# Patient Record
Sex: MALE | Race: WHITE | Employment: FULL TIME | URBAN - METROPOLITAN AREA
[De-identification: names, ages, dates, MRNs, and addresses within clinical notes are randomized per-mention and may not be internally consistent; named-entity substitution may affect disease eponyms.]

---

## 2019-06-20 ENCOUNTER — HOSPITAL ENCOUNTER (OUTPATIENT)
Dept: WOUND CARE | Age: 64
Discharge: HOME | End: 2019-06-20
Payer: COMMERCIAL

## 2019-06-20 VITALS
TEMPERATURE: 98.5 F | HEART RATE: 73 BPM | SYSTOLIC BLOOD PRESSURE: 159 MMHG | RESPIRATION RATE: 16 BRPM | DIASTOLIC BLOOD PRESSURE: 96 MMHG

## 2019-06-20 PROBLEM — L97.923 NON-PRESSURE CHRONIC ULCER OF LEFT LOWER LEG WITH NECROSIS OF MUSCLE (HCC): Status: ACTIVE | Noted: 2019-06-20

## 2019-06-20 PROBLEM — L97.929 CHRONIC VENOUS HYPERTENSION (IDIOPATHIC) WITH ULCER OF LEFT LOWER EXTREMITY (HCC): Status: ACTIVE | Noted: 2019-06-20

## 2019-06-20 PROBLEM — I87.312 CHRONIC VENOUS HYPERTENSION (IDIOPATHIC) WITH ULCER OF LEFT LOWER EXTREMITY (HCC): Status: ACTIVE | Noted: 2019-06-20

## 2019-06-20 PROBLEM — S81.812A LACERATION OF LEFT LOWER LEG WITHOUT FOREIGN BODY: Status: ACTIVE | Noted: 2019-06-20

## 2019-06-20 PROCEDURE — 11043 DBRDMT MUSC&/FSCA 1ST 20/<: CPT

## 2019-06-20 PROCEDURE — 74011000250 HC RX REV CODE- 250: Performed by: OTOLARYNGOLOGY

## 2019-06-20 RX ORDER — AMOXICILLIN AND CLAVULANATE POTASSIUM 500; 125 MG/1; MG/1
1 TABLET, FILM COATED ORAL 2 TIMES DAILY
COMMUNITY
End: 2019-08-15

## 2019-06-20 RX ORDER — METFORMIN HYDROCHLORIDE 500 MG/1
500 TABLET ORAL 2 TIMES DAILY WITH MEALS
COMMUNITY

## 2019-06-20 RX ORDER — FUROSEMIDE 20 MG/1
40 TABLET ORAL 2 TIMES DAILY
COMMUNITY

## 2019-06-20 RX ORDER — POTASSIUM CHLORIDE 750 MG/1
10 CAPSULE, EXTENDED RELEASE ORAL DAILY
Status: ON HOLD | COMMUNITY
End: 2021-03-19 | Stop reason: CLARIF

## 2019-06-20 RX ORDER — MUPIROCIN CALCIUM 20 MG/G
CREAM TOPICAL 2 TIMES DAILY
COMMUNITY
End: 2019-08-15

## 2019-06-20 RX ORDER — LANOLIN ALCOHOL/MO/W.PET/CERES
1 CREAM (GRAM) TOPICAL DAILY
Status: ON HOLD | COMMUNITY
End: 2021-03-19 | Stop reason: CLARIF

## 2019-06-20 RX ADMIN — Medication: at 10:00

## 2019-06-20 NOTE — WOUND CARE
06/20/19 0950   Wound Leg lower Left;Lateral;Distal   Date First Assessed/Time First Assessed: 06/20/19 7921   Location: Leg lower  Wound Location Orientation: Left;Lateral;Distal   Dressing Type Applied Packing;Gauze;Gauze wrap (santos)  (iodoform packing, double tubigrip.)       Discharge Condition:stable  Ambulatory Status: ambulatory  Discharge Destination: home  Transportation:  Personal car  Accompanied by:  wife

## 2019-06-20 NOTE — WOUND CARE
06/20/19 0853   Wound Leg lower Left;Lateral;Distal   Date First Assessed/Time First Assessed: 06/20/19 7345   Location: Leg lower  Wound Location Orientation: Left;Lateral;Distal   Dressing Status Breakthrough drainage;Removed   Dressing Type Adhesive wound dressing (Band-Aid)   Wound Length (cm) 2.5 cm   Wound Width (cm) 2.2 cm   Wound Depth (cm) 1.8 cm   Wound Volume (cm^3) 9.9 cm^3   Condition of Base Pink;Slough   Drainage Amount Moderate   Drainage Color Serosanguinous   Wound Odor None   Colleen-wound Assessment Blanchable erythema   Cleansing and Cleansing Agents  Normal saline     Visit Vitals  BP (!) 159/96   Pulse 73   Temp 98.5 °F (36.9 °C)   Resp 16       Due to network connection error unable to obtain and upload image

## 2019-06-20 NOTE — H&P
[address]  HISTORY AND PHYSICAL    Name:  Vikki Alonso  MR#:  [id number]  :  1955  ACCOUNT #:  [de-identified]  ADMIT DATE:  2019      HISTORY OF PRESENT ILLNESS:  The patient is a 75-year-old male who fell while in [place] on . He developed a hematoma of the left lateral distal leg. He went scuba diving afterwards, but the skin was still intact. He subsequently spontaneously drained and he was seen by Dr. Halina Deng who placed him on mupirocin and Augmentin. PAST MEDICAL HISTORY:  Significant that he had open reduction and internal fixation of the left lateral ankle in  and in , he had a left ankle fusion. In 2017, he developed cellulitis of the left lower extremity. He was admitted to [facility].  His infectious disease doctor was Dr. Greg Roger, who aspirated the wound and cultured Streptococcus and the patient was on IV antibiotics for two weeks. In 2017. The patient saw Dr. Lidia Rincon who obtained x-rays showing no osteomyelitis. The screws were in proper position and the fusion was stable. He recommended that the patient see wound care or a vascular surgeon, but that never occurred. The patient has otherwise been well since then until this recent trauma. He always has a left lower extremity more edematous than the right. He wears diabetics socks but not compression stockings. REVIEW OF SYSTEMS:  Type 2 diabetes and hypertension. MEDICATIONS:  Metformin, currently Augmentin 500/125 mg, and Lasix is usually 20 mg per day and that has been increased to 40 mg b.i.d. by his primary doctor. FAMILY HISTORY:  Noncontributory. PHYSICAL EXAMINATION:  GENERAL:  The patient is awake, alert and conversant. VITAL SIGNS:  His temperature is 98.5, pulse 73, respirations 16, blood pressure 159/96. NEUROLOGIC EXAM:  Cranial nerves II-XII grossly intact.   HEAD AND NECK EXAM:  Oral cavity, oropharynx, palpation of neck is normal.  LUNGS:  Clear to auscultation and percussion. HEART:  Regular rate without murmur. ABDOMEN:  Soft, nontender, no organomegaly. UPPER EXTREMITIES:  Equal tone and strength bilaterally. BACK:  Nontender to palpation. LOWER EXTREMITIES:  The right lower extremity has no edema and good strong dorsalis pedis pulse. The left leg is more edematous than the right with pitting edema. He does have palpable dorsalis pedis pulses. The posterior tibialis is nonpalpable. There is a wound of 2.5 x 2.2 with a depth of 1.8 cm in the central portion of the wound. Bone and hardware is nonpalpable. There is an old clot in the wound and devitalized tissue. It is recommended that the wound be debrided. I explained the risks and benefits. The patient understood and wished to proceed. Therefore, topical Xylocaine 4% gel was applied for 10 minutes. The wound was probed with a Q-tip. It was then debrided of old blood and devitalized tissues down to muscle. The post debridement dimensions remain 2.5 x 2.2 with a depth that is now 1.9 cm. The wound was then painted with Betadine solution. ASSESSMENT AND PLAN:  I explained to the patient that he has left venous insufficiency, almost certainly due to fusion of his left ankle. He does have good strong pulses with his left SHAHRAM of 0.95. Nevertheless, because of his diabetes I am going to get a left SHAHRAM and TBI to make sure we are not dealing with calcification of the vessels. In addition, I am getting a left venous duplex with reflux to rule out a chronic DVT or  reflux. He is going to finish his antibiotics. His wife is going to help him pack the wound with Iodoform gauze daily. He will the keep the area dry. He will keep his leg elevated. We will use double Tubigrips. He will try to do gastrocnemius muscle exercise as well as possible which might require more of an isometric exercise since he has fusion of the left ankle.     He understands that if he is not improving very quickly, we are going to get an MRI or I will refer him back to Dr. Donaldo Thompson for consideration of getting an MRI as per his instructions. The patient will return in one week. All questions were answered. His condition on discharge is stable.       Gee Connor MD      [id number]  D:  06/20/2019 9:42  T:  06/20/2019 9:51  JOB #:  [id number]  CC:  MD Mar yLou Connors Dr.

## 2019-06-27 ENCOUNTER — HOSPITAL ENCOUNTER (OUTPATIENT)
Dept: WOUND CARE | Age: 64
Discharge: HOME | End: 2019-06-27
Payer: COMMERCIAL

## 2019-06-27 VITALS
RESPIRATION RATE: 16 BRPM | HEART RATE: 62 BPM | DIASTOLIC BLOOD PRESSURE: 85 MMHG | SYSTOLIC BLOOD PRESSURE: 124 MMHG | TEMPERATURE: 98.2 F

## 2019-06-27 PROCEDURE — 11042 DBRDMT SUBQ TIS 1ST 20SQCM/<: CPT

## 2019-06-27 PROCEDURE — 74011000250 HC RX REV CODE- 250: Performed by: OTOLARYNGOLOGY

## 2019-06-27 RX ADMIN — Medication: at 10:00

## 2019-06-27 NOTE — WOUND CARE
06/27/19 2889 Wound Leg lower Left;Lateral;Distal  
Date First Assessed/Time First Assessed: 06/20/19 0852   Location: Leg lower  Wound Location Orientation: Left;Lateral;Distal  
Dressing Status Removed;Breakthrough drainage Dressing Type Adhesive wound dressing (Band-Aid);Packing Wound Length (cm) 2 cm Wound Width (cm) 1.5 cm Wound Depth (cm) 1.5 cm Wound Volume (cm^3) 4.5 cm^3 Condition of Base Granulation Drainage Amount Moderate Drainage Color Serosanguinous Wound Odor None Colleen-wound Assessment Intact Cleansing and Cleansing Agents  Normal saline Procedure Tolerated Well Visit Vitals /85 Pulse 62 Temp 98.2 °F (36.8 °C) Resp 16 Due to network connection error unable to obtain and upload image

## 2019-06-27 NOTE — WOUND CARE
06/27/19 1005 Wound Leg lower Left;Lateral;Distal  
Date First Assessed/Time First Assessed: 06/20/19 0852   Location: Leg lower  Wound Location Orientation: Left;Lateral;Distal  
Dressing Type Applied Silver products; Absorptive; Compression Wrap/Venous Stasis Procedure Tolerated Well Discharge Condition:Stable Ambulatory Status:Amulatory Discharge Destination:Home Transportation: Private Accompanied by: Self

## 2019-06-27 NOTE — PROGRESS NOTES
[address] [place] [address] PROGRESS NOTE    Name:  Bea Lea  MR#:  [id number]  :  1955  ACCOUNT #:  [de-identified]  DATE OF SERVICE:  2019    SUBJECTIVE:  The patient returns for treatment of a laceration without foreign body of the left lateral ankle S81.812D, in a patient with type 2 diabetes E11.622 and a venous leg ulcer I87.312, which goes down to muscle L97.923. The patient was seen for a history and physical last week. We started him on Iodoform packing along with double Tubigrips. He did purchase 20-30 mmHg compression stockings, which he has been using intermittently with his Tubigrips. I ordered vascular studies this week. The ABIs and TBIs show excellent blood flow of the left lower extremity with his left TBI at 0.69. In addition, a venous duplex with reflux was obtained showing significant reflux in the common femoral vein, but the perforators seemed not to be an issue. This was reviewed and explained to the patient and his wife. OBJECTIVE:  His temperature today is 98.2, pulse 62, respirations 16, blood pressure 124/85. WOUND EXAMINATION:  The wound of the left lateral ankle last week was 2.5 x 2.2 x 1.8 cm. Today, the dimensions are improved in all three dimensions to 2 x 1.5 x 1.5 cm. The wound is much . There are no longer any residual clot, but there is a layer of slough over the wound and it is recommended that this be debrided. I explained the risks and benefits. The patient understood and wished to proceed. Therefore, topical lidocaine 4% gel was applied for 10 minutes. Using a 5-mm ring curette, the wound was debrided down to nice healthy bleeding subcutaneous tissue. It is no longer down to the level of muscle. The wound dimensions after debridement are 2.1 x 1.6 x 1.6 cm. The wound was then scrubbed with Hibiclens followed by normal saline. ASSESSMENT AND PLAN:  The patient is doing very well on current therapy. Therefore, we are going to try to help him even further by using a three-layer wrap. Therefore, Aquacel Ag was applied to the wound followed by ExuDry and a three-layer wrap. The patient was instructed to keep his leg elevated as much as possible. He will do whatever he can to try to do gastrocnemius muscle exercises, which might require isometric exercises. I explained to him that if his toes become dusky or if the dressing becomes saturated, falls down, or is too tight, he can remove it and apply his 20-30 mmHg stocking. If he needs a nursing visit during the week, he will call on Monday, 07/01 to have that done. I will see him again in a week. All questions were answered. His condition on discharge is stable.       Romayne Dare, MD      [email or web address]  D:  06/27/2019 10:50  T:  06/27/2019 11:05  JOB #:  [id number]

## 2019-07-03 ENCOUNTER — HOSPITAL ENCOUNTER (OUTPATIENT)
Dept: WOUND CARE | Age: 64
Discharge: HOME | End: 2019-07-03
Payer: COMMERCIAL

## 2019-07-03 VITALS
RESPIRATION RATE: 16 BRPM | DIASTOLIC BLOOD PRESSURE: 86 MMHG | HEART RATE: 72 BPM | TEMPERATURE: 98.3 F | SYSTOLIC BLOOD PRESSURE: 130 MMHG

## 2019-07-03 DIAGNOSIS — I87.312 CHRONIC VENOUS HYPERTENSION (IDIOPATHIC) WITH ULCER OF LEFT LOWER EXTREMITY (HCC): ICD-10-CM

## 2019-07-03 DIAGNOSIS — L97.923 NON-PRESSURE CHRONIC ULCER OF LEFT LOWER LEG WITH NECROSIS OF MUSCLE (HCC): ICD-10-CM

## 2019-07-03 DIAGNOSIS — L97.929 CHRONIC VENOUS HYPERTENSION (IDIOPATHIC) WITH ULCER OF LEFT LOWER EXTREMITY (HCC): ICD-10-CM

## 2019-07-03 PROCEDURE — 11042 DBRDMT SUBQ TIS 1ST 20SQCM/<: CPT

## 2019-07-03 NOTE — PROGRESS NOTES
07/03/19 1544   Wound Leg lower Left;Lateral;Distal   Date First Assessed/Time First Assessed: 06/20/19 4752   Location: Leg lower  Wound Location Orientation: Left;Lateral;Distal   Wound Length (cm) 2.5 cm   Wound Width (cm) 1.4 cm   Wound Depth (cm) 0.2 cm   Wound Volume (cm^3) 0.7 cm^3   Condition of Base Pink   Drainage Amount Scant   Drainage Color Serosanguinous   Wound Odor None   Colleen-wound Assessment Blanchable erythema   Cleansing and Cleansing Agents  Chlorhexidine gluconate 4%;Normal saline     Visit Vitals  /86   Pulse 72   Temp 98.3 °F (36.8 °C)   Resp 16

## 2019-07-03 NOTE — PROGRESS NOTES
[address] [place] [address] PROGRESS NOTE    Name:  Amilcar Ace  MR#:  [id number]  :  1955  ACCOUNT #:  [de-identified]  DATE OF SERVICE:  2019      SUBJECTIVE:  The patient returns for treatment of a laceration without foreign body of the left lateral ankle, S81.812D in a type 2 diabetic, E11.622, with a venous hypertension ulcer, I87.312, which originally extended down to muscle, L97.923. The patient originally had a hematoma which was evacuated. Last week, we switched him to a three-layer wrap for the first time. He had a good week and denies any problems or changes in medications, allergies, or review of systems. OBJECTIVE:  VITAL SIGNS:  His temperature today is 98.3, pulse 72, respirations 16, blood pressure 130/86. WOUND EXAMINATION:  The wound dimensions today are 2.5 x 1.4 x 0.2 cm. The wound is filling in quite well. It is a little bit soft and boggy with devitalized tissue covering the wound. It is recommend that the wound be debrided. I explained the risks and benefits. The patient understood and wished to proceed. Therefore, topical Xylocaine 4% gel was applied for 10 minutes. Using a 5-mm ring curette, the wound was debrided of all devitalized tissue. Post debridement dimensions are 2.6 x 1.5 x 0.3 cm. Hemostasis was achieved with gentle pressure until dry. The wound was then scrubbed with Hibiclens followed by normal saline. ASSESSMENT AND PLAN:  The patient is doing very well on current therapy. Therefore, we are going to continue Aquacel Ag, Exu-Dry, and a three-layer wrap. He will keep his leg elevated as much as possible. He will do gastrocnemius muscle exercise hourly while awake. He does have 20-30 mmHg compression stockings for use once he has healed. He will return in 1 week. All questions were answered. His condition on discharge is stable.         MD NELI [email or web address]  D:  2019 17:10  T:  2019 17:16  JOB #:  [id number]

## 2019-07-11 ENCOUNTER — HOSPITAL ENCOUNTER (OUTPATIENT)
Dept: WOUND CARE | Age: 64
Discharge: HOME | End: 2019-07-11
Payer: COMMERCIAL

## 2019-07-11 VITALS
DIASTOLIC BLOOD PRESSURE: 88 MMHG | TEMPERATURE: 98.2 F | RESPIRATION RATE: 16 BRPM | HEART RATE: 59 BPM | SYSTOLIC BLOOD PRESSURE: 133 MMHG

## 2019-07-11 PROCEDURE — 10140 I&D HMTMA SEROMA/FLUID COLLJ: CPT

## 2019-07-11 PROCEDURE — 74011000250 HC RX REV CODE- 250: Performed by: OTOLARYNGOLOGY

## 2019-07-11 RX ADMIN — Medication: at 11:00

## 2019-07-11 NOTE — WOUND CARE
Visit Vitals /88 (BP 1 Location: Right arm, BP Patient Position: Sitting) Pulse (!) 59 Temp 98.2 °F (36.8 °C) Resp 16  
 
 
 07/11/19 1011 Wound Leg lower Left;Lateral;Distal  
Date First Assessed/Time First Assessed: 06/20/19 0852   Location: Leg lower  Wound Location Orientation: Left;Lateral;Distal  
Dressing Status Clean, dry, and intact Dressing Type Silver products; Compression Wrap/Venous Stasis Wound Length (cm) 2.5 cm Wound Width (cm) 1.4 cm Wound Depth (cm) 0.2 cm Wound Volume (cm^3) 0.7 cm^3 Condition of Base Pink Drainage Amount Scant Drainage Color Serosanguinous Wound Odor None Colleen-wound Assessment Intact Cleansing and Cleansing Agents  Soap and water

## 2019-07-11 NOTE — WOUND CARE
07/11/19 1030 Wound Leg lower Left;Lateral;Distal  
Date First Assessed/Time First Assessed: 06/20/19 0852   Location: Leg lower  Wound Location Orientation: Left;Lateral;Distal  
Dressing Type Applied Collagens/cell matrix; Absorptive; Compression Wrap/Venous Stasis (hfbr) Discharge Condition:Stable Ambulatory Status:Walking Discharge Destination:Home Transportation: Private Accompanied by: Wife and family

## 2019-07-11 NOTE — PROGRESS NOTES
[address] [place] [address] PROGRESS NOTE    Name:  Jettie Meigs  MR#:  [id number]  :  1955  ACCOUNT #:  [de-identified]  DATE OF SERVICE:  2019      SUBJECTIVE:  The patient returns for a laceration without foreign body of the left lateral ankle S81.812D in a type 2 diabetic E11.622 with a venous leg ulcer I87.312, which goes down to muscle, L97.913. Unfortunately, during the week the patient dropped a bag of ice on his left lateral ankle, just distal to the area of the wound. OBJECTIVE:  VITAL SIGNS:  His temperature today is 98.2, pulse 59, respirations 16, blood pressure 133/88. WOUND EXAMINATION:  Examination of the left lateral ankle shows two wounds. The primary wound is the wound that he presented with initially, which is dramatically smaller at 1.1 x 1.1 x 0.2 cm. Then there is healthy skin and distally there is a small hematoma. Collectively, these two wounds now measure 2.5 x 1.4 x 0.2 cm. I recommended that the distal hematoma be evacuated. I explained the risks and benefits. The patient understood and wished to proceed. Therefore, topical Xylocaine was applied for 5 minutes. Using a 5-mm ring curette, the hematoma was evacuated from the wound and in the area of undermining proximally towards the primary wound. Hemostasis was achieved with firm pressure until dry. ASSESSMENT AND PLAN:  The patient's primary wound is doing quite well. Unfortunately, this new traumatic wound has set things back. We are going to change the dressing to both wounds including the area of undermining with Rita Ag and cover this with Hydrofera Blue followed by a three-layer wrap. The patient will keep his legs elevated. He will do gastrocnemius muscle exercise hourly while awake. He will return to see us in one week. All questions were answered. His condition on discharge is stable.       Monique Gonzalez MD      [email or web address]  D:  2019 10:45  T: 07/11/2019 10:53  JOB #:  [id number]

## 2019-07-18 ENCOUNTER — HOSPITAL ENCOUNTER (OUTPATIENT)
Dept: WOUND CARE | Age: 64
Discharge: HOME | End: 2019-07-18
Payer: COMMERCIAL

## 2019-07-18 VITALS
SYSTOLIC BLOOD PRESSURE: 132 MMHG | HEART RATE: 66 BPM | TEMPERATURE: 98.7 F | RESPIRATION RATE: 16 BRPM | DIASTOLIC BLOOD PRESSURE: 88 MMHG

## 2019-07-18 PROCEDURE — 97597 DBRDMT OPN WND 1ST 20 CM/<: CPT

## 2019-07-18 PROCEDURE — 74011000250 HC RX REV CODE- 250: Performed by: OTOLARYNGOLOGY

## 2019-07-18 RX ADMIN — Medication: at 10:00

## 2019-07-18 NOTE — PROGRESS NOTES
[address] [place] [address] PROGRESS NOTE    Name:  Bea Lea  MR#:  [id number]  :  1955  ACCOUNT #:  [de-identified]  DATE OF SERVICE:  2019      SUBJECTIVE:  The patient returns for treatment of a wound of his left lateral leg which is a laceration without foreign body, S81.812D in a type 2 diabetic, E11.622, with a venous hypertension ulcer, I87.312 which originally went down to muscle, L97.913. Last week, the patient had a second wound due to another accidental trauma. We switched the dressing to Irta, Hydrofera blue, and a three-layer wrap. He has been keeping his leg elevated and doing gastrocnemius muscle exercises. The only change in medications over the past week is his furosemide dose was decreased to 20 mg b.i.d.    OBJECTIVE:  VITAL SIGNS:  The patient's temperature is 98.7, pulse 66, respirations 16, blood pressure 132/80. WOUND EXAMINATION:  The wound dimensions today are 1.9 x 0.5 x 0.2 cm. The wound is filled with hypertrophic tissue that is filling the wound. It is recommended the wound be debrided and cauterized to create involution of the hypertrophic tissue. I explained the risks and benefits. The patient understood and wished to proceed. Therefore, topical Xylocaine 4% gel was applied for 10 minutes. Using a 5-mm ring curette, the wound was debrided of all devitalized tissue. Hemostasis was achieved with gentle pressure until dry. The hypertrophic tissue was then cauterized in its entirety and then irrigated clear with saline. ASSESSMENT AND PLAN:  Due to the hypergranular tissue, we are going to change the dressing to Mepilex Ag foam followed by a three-layer wrap. The patient will start using his compression stocking on the right leg. He is going to keep his legs elevated. He will do gastrocnemius muscle exercise hourly while awake. Next week, we are going to order a lymphedema pump. All questions were answered.   His condition on discharge is stable. He will return in 1 week.         Deanne Early MD      [email or web address]  D:  07/18/2019 11:12  T:  07/18/2019 11:20  JOB #:  [id number]

## 2019-07-18 NOTE — WOUND CARE
07/18/19 1057   Wound Leg lower Left;Lateral;Distal   Date First Assessed/Time First Assessed: 06/20/19 5122   Location: Leg lower  Wound Location Orientation: Left;Lateral;Distal   Dressing Type Applied Silver products; Compression Wrap/Venous Stasis   Procedure Tolerated Well       Discharge Condition: stable  Ambulatory Status: ambulatory without assistance  Discharge Destination: home  Transportation:  Personal car  Accompanied by:  wife

## 2019-07-18 NOTE — WOUND CARE
07/18/19 0957   Wound Leg lower Left;Lateral;Distal   Date First Assessed/Time First Assessed: 06/20/19 7453   Location: Leg lower  Wound Location Orientation: Left;Lateral;Distal   Dressing Status Clean, dry, and intact; Removed   Dressing Type Compression Wrap/Venous Stasis; Absorptive  (HFBR)   Wound Length (cm) 1.9 cm   Wound Width (cm) 0.5 cm   Wound Depth (cm) 0.6 cm   Wound Volume (cm^3) 0.57 cm^3   Condition of Base [place];Granulation   Drainage Amount Small   Drainage Color Serosanguinous   Wound Odor None   Colleen-wound Assessment Intact   Cleansing and Cleansing Agents  Soap and water       Visit Vitals  /88   Pulse 66   Temp 98.7 °F (37.1 °C)   Resp 16     Due to network connection error unable to obtain and upload image

## 2019-07-25 ENCOUNTER — HOSPITAL ENCOUNTER (OUTPATIENT)
Dept: WOUND CARE | Age: 64
Discharge: HOME | End: 2019-07-25
Payer: COMMERCIAL

## 2019-07-25 VITALS
RESPIRATION RATE: 16 BRPM | DIASTOLIC BLOOD PRESSURE: 80 MMHG | SYSTOLIC BLOOD PRESSURE: 129 MMHG | HEART RATE: 56 BPM | TEMPERATURE: 97.7 F

## 2019-07-25 PROCEDURE — 74011000250 HC RX REV CODE- 250: Performed by: OTOLARYNGOLOGY

## 2019-07-25 PROCEDURE — 17250 CHEM CAUT OF GRANLTJ TISSUE: CPT

## 2019-07-25 RX ADMIN — Medication: at 11:00

## 2019-07-25 NOTE — WOUND CARE
07/25/19 1115   Wound Leg lower Left;Lateral;Distal   Date First Assessed/Time First Assessed: 06/20/19 9231   Location: Leg lower  Wound Location Orientation: Left;Lateral;Distal   Dressing Changed Changed/New   Dressing Type Applied Silver products; Compression Wrap/Venous Stasis   Procedure Tolerated Well   Pt DC to home via self, ambulatory, stable

## 2019-07-25 NOTE — WOUND CARE
07/25/19 1035   Wound Leg lower Left;Lateral;Distal   Date First Assessed/Time First Assessed: 06/20/19 1053   Location: Leg lower  Wound Location Orientation: Left;Lateral;Distal   Dressing Status Clean, dry, and intact; Removed   Dressing Type Compression Wrap/Venous Stasis; Foam   Wound Length (cm) 1.3 cm   Wound Width (cm) 0.4 cm   Wound Depth (cm) 1.3 cm   Wound Volume (cm^3) 0.68 cm^3   Condition of Base Granulation   Drainage Amount Small   Drainage Color Serosanguinous   Wound Odor None   Colleen-wound Assessment Intact   Cleansing and Cleansing Agents  Soap and water     Visit Vitals  /80   Pulse (!) 56   Temp 97.7 °F (36.5 °C)   Resp 16     Due to network connection error unable to obtain and upload image

## 2019-07-25 NOTE — PROGRESS NOTES
[address] [place] [address] PROGRESS NOTE    Name:  Lev Mcqueen  MR#:  [id number]  :  1955  ACCOUNT #:  [de-identified]  DATE OF SERVICE:  2019      SUBJECTIVE:  The patient returns for treatment of a laceration without foreign body of the left leg, S81.812D, in a type 2 diabetic, E11.622, with a venous hypertension ulcer, I87.312, which originally went down to muscle, L97.913, and secondary lymphedema I89.0. Last week, the wound was hypergranular. It was debrided and cauterized with silver nitrate. We changed the dressing to Mepilex silver foam and a three-layer wrap. The patient has been using compression on the right side and as had no problems using it. There have been no changes in medications, allergies or review of systems. OBJECTIVE:  His temperature today is 97.7, pulse 56, respirations 16, blood pressure 129/80. The wound dimensions today are improved to 1.3 x 0.4 x 0.1 cm. The wound is covered with soft slough which was easily removed just with a moistened gauze. There was hypertrophic spongy granulation tissue filling the wound and mature skin edges. It was recommended that the wound be cauterized with silver nitrate to create involution of the hypergranular tissue and to treat the surrounding epibole. I explained the risk and benefits. The patient understood and wished to proceed. Therefore, topical Xylocaine 4% gel was applied for 5 minutes. This was wiped clean and the wound was then cauterized in its entirety with silver nitrate. This was irrigated clear. ASSESSMENT AND PLAN:  We are going to continue silver foam and a three-layer wrap along with leg elevation, gastrocnemius muscle exercise hourly while awake, and the lymphedema pump twice a day. We are going to order a lymphedema pump today for long-term management of this patient's lymphedema and recurrent wounds due to venous hypertension. All questions were answered.   His condition on discharge is stable.         MD NELI [email or web address]  D:  07/25/2019 11:05  T:  07/25/2019 11:13  JOB #:  [id number]

## 2019-08-01 ENCOUNTER — HOSPITAL ENCOUNTER (OUTPATIENT)
Dept: WOUND CARE | Age: 64
Discharge: HOME | End: 2019-08-01
Payer: COMMERCIAL

## 2019-08-01 VITALS
TEMPERATURE: 97.9 F | SYSTOLIC BLOOD PRESSURE: 120 MMHG | HEART RATE: 65 BPM | DIASTOLIC BLOOD PRESSURE: 79 MMHG | RESPIRATION RATE: 16 BRPM

## 2019-08-01 DIAGNOSIS — I87.312 CHRONIC VENOUS HYPERTENSION (IDIOPATHIC) WITH ULCER OF LEFT LOWER EXTREMITY (HCC): ICD-10-CM

## 2019-08-01 DIAGNOSIS — L97.929 CHRONIC VENOUS HYPERTENSION (IDIOPATHIC) WITH ULCER OF LEFT LOWER EXTREMITY (HCC): ICD-10-CM

## 2019-08-01 DIAGNOSIS — L97.923 NON-PRESSURE CHRONIC ULCER OF LEFT LOWER LEG WITH NECROSIS OF MUSCLE (HCC): ICD-10-CM

## 2019-08-01 PROCEDURE — 11043 DBRDMT MUSC&/FSCA 1ST 20/<: CPT

## 2019-08-01 PROCEDURE — 74011000250 HC RX REV CODE- 250: Performed by: OTOLARYNGOLOGY

## 2019-08-01 RX ADMIN — Medication: at 11:00

## 2019-08-01 NOTE — PROGRESS NOTES
[address] [place] [address] PROGRESS NOTE    Name:  Win Bautista  MR#:  [id number]  :  1955  ACCOUNT #:  [de-identified]  DATE OF SERVICE:  2019    SUBJECTIVE:  The patient returns for treatment of a laceration without foreign body of the left leg, S81.812D, with a venous hypertension ulcer I87.312, which extends down to muscle, L97.913 in a type 2 diabetic, E11.622, with secondary lymphedema I89.0. The patient traveled a lot this week. He flew and had difficulty at [facility], where the agents kept massaging his leg with his wrap, which he felt was counterproductive for his wound. His lymphedema pump is still on hold, waiting for insurance approval.  There have been no other changes noted in his medications, allergies, or review of systems. OBJECTIVE:  VITAL SIGNS:  His temperature is 97.9, pulse 65, respirations 16, and blood pressure 120/79. WOUND EXAMINATION:  The wound dimensions last week were 1.3 x 0.4 x 1.3 cm. Today, they are slightly longer and wider at 1.9 x 0.9 x 0.7 cm, without any signs of cellulitis such as erythema, tenderness, or pus. The wound is filled with soft spongy tissue which is poorly developed granulation tissue with minimal tensile strength. It is recommended that the wound be debrided of all devitalized tissue. I explained the risks and benefits. The patient understood and wished to proceed. Therefore, topical Xylocaine 4% gel was applied for 10 minutes. Using a compressed 5-mm ring curette, all of the soft spongy hypergranular tissue was excised down to healthy muscle. All edges of the wound and the residual bed of the wound were then cauterized with silver nitrate to help manage the hypertrophic tissue. This was then irrigated clear with saline. ASSESSMENT AND PLAN:  The patient continues to have significant edema and hopefully, he will improve dramatically once he is able to use the lymphedema pump once it is available.   He will increase his furosemide to twice a day for the next 2 weeks to see if this will help mange his lower extremity edema while he is traveling. He is due to drive 6 hours on Monday, but will try to take frequent breaks to elevate his leg. He knows that he should continue to do gastrocnemius muscle exercises. We are going to continue with silver foam as the dressing, but we are going to go for a four-layer wrap to see if this will help him, especially since he has such a good SHAHRAM on that side. He will continue leg elevation and exercises and will use his pump twice a day as soon as it arrives. All questions were answered. His condition on discharge is stable.       Cash Tejada MD      [email or web address]  D:  08/01/2019 10:45  T:  08/01/2019 10:53  JOB #:  [id number]

## 2019-08-01 NOTE — WOUND CARE
08/01/19 1055 Wound Leg lower Left;Lateral;Distal  
Date First Assessed/Time First Assessed: 06/20/19 0852   Location: Leg lower  Wound Location Orientation: Left;Lateral;Distal  
Dressing Type Applied ABD pad;Foam;Compression Wrap/Venous Stasis (mep ag, 4 layer wrap) Procedure Tolerated Well Discharge Condition:stable Ambulatory Status:ambulatory Discharge Destination:home Transportation: private auto Accompanied by: spouse

## 2019-08-01 NOTE — WOUND CARE
08/01/19 1020 Wound Leg lower Left;Lateral;Distal  
Date First Assessed/Time First Assessed: 06/20/19 0852   Location: Leg lower  Wound Location Orientation: Left;Lateral;Distal  
Dressing Status Clean, dry, and intact Dressing Type Compression Wrap/Venous Stasis Wound Length (cm) 1.9 cm Wound Width (cm) 0.9 cm Wound Depth (cm) 0.7 cm Wound Volume (cm^3) 1.2 cm^3 Drainage Amount Small Drainage Color Serosanguinous Wound Odor None Colleen-wound Assessment Intact Cleansing and Cleansing Agents  Soap and water Visit Vitals /79 Pulse 65 Temp 97.9 °F (36.6 °C) Resp 16

## 2019-08-01 NOTE — DISCHARGE INSTRUCTIONS
Discharge Instructions for  [address] [facility], [facility] [address]  Telephone: [phone]    NAME:  Kellen Fang  YOB: 1955  MEDICAL RECORD NUMBER:  [id number]  DATE:  8/1/2019    Wound Cleansing:   Do not scrub or use excessive force. Cleanse wound prior to applying a clean dressing with:  [] Normal Saline [] Keep Wound Dry in Shower    [] Wound Cleanser   [x] Cleanse wound with Mild Soap & Water  [] May Shower at Discharge   [] Other:       Topical Treatments:  Do not apply lotions, creams, or ointments to wound bed unless directed. [x] Apply moisturizing lotion to skin surrounding the wound prior to dressing change.  [] Apply antifungal ointment to skin surrounding the wound prior to dressing change.  [] Apply thin film of moisture barrier ointment to skin immediately around wound. [] Other:       Dressings:           Wound Location Left lower leg  [x] Apply Primary Dressing:       [] MediHoney Gel [] Alginate with Silver [] Alginate   [] Collagen [] Collagen with Silver   [] Santyl with Moisten saline gauze     [] Hydrocolloid   [] MediHoney Alginate [] Foam with Silver   [] Foam   [] Hydrofera Blue    [] Mepilex Border    [] Moisten with Saline [] Hydrogel [] Mepitel     [] Bactroban/Mupirocin [] Polysporin  [x] Other:   Mepilex ag, cut mep ag to wound size , then cover with larger piece. [] Pack wound loosely with  [] Iodoform   [] Plain Packing  [] Other   [x] Cover and Secure with:     [] Gauze [] Kristian [] Kerlix   [] Ace Wrap [] Cover Roll Tape [x] ABD     [] Other:    Avoid contact of tape with skin.   [x] Change dressing: [] Daily    [] Every Other Day [] Three times per week   [x] Once a week [] Do Not Change Dressing   [] Other:       Edema Control:  Apply: [] Compression Stocking []Right Leg []Left Leg   [] Tubigrip []Right Leg Double Layer []Left Leg Double Layer       []Right Leg Single Layer []Left Leg Single Layer   [] SpandaGrip []Right Leg  []Left Leg      []Low compression 5-10 mm/Hg      []Medium compression 10-20 mm/Hg     []High compression  20-30 mm/Hg   every morning immediately when getting up should be applied to affected leg(s) from mid foot to knee making sure to cover the heel. Remove every night before going to bed. [x] Elevate leg(s) above the level of the heart when sitting. [x] Avoid prolonged standing in one place. [] Elevate arm/hand above the level of the heart []RightArm []LeftArm     Compression:  Apply: [x] Multilayer Compression Wrap Applied in Clinic []RightLeg [x]Left Leg 4 layer wrap   [x] Multi-layer compression. Do not get leg(s) with wrap wet. If wraps become too tight call the center or completely remove the wrap. [x] Elevate leg(s) above the level of the heart when sitting. [x] Avoid prolonged standing in one place     Dietary:  [] Diet as tolerated: [] Calorie Diabetic Diet: [] No Added Salt:  [x] Increase Protein: [] Other:   Activity:  [x] Activity as tolerated:  [] Patient has no activity restrictions     [] Strict Bedrest: [] Remain off Work:     [] May return to full duty work:                                   [] Return to work with restrictions:             Return Appointment:  [] Wound and dressing supply provider:   [] ECF or Home Healthcare:  [] Wound Assessment: [] Physician or NP scheduled for Wound Assessment:   [x] Return Appointment: With Dr. Jonathan Box  in  [address] [place] [address])  [] Ordered tests:      Electronically signed on 8/1/2019 at 10:33 AM     Leticia Martinez 281: Should you experience any significant changes in your wound(s) or have questions about your wound care, please contact the [facility] Main at [address] [place] [address] 8:00 am - 4:30. If you need help with your wound outside these hours and cannot wait until we are again available, contact your PCP or go to the hospital emergency room.      PLEASE NOTE: IF YOU ARE UNABLE TO OBTAIN WOUND SUPPLIES, CONTINUE TO USE THE SUPPLIES YOU HAVE AVAILABLE UNTIL YOU ARE ABLE TO REACH US. IT IS MOST IMPORTANT TO KEEP THE WOUND COVERED AT ALL TIMES.      Physician Signature:_______________________    Date: ___________ Time:  ____________

## 2019-08-08 ENCOUNTER — HOSPITAL ENCOUNTER (OUTPATIENT)
Dept: WOUND CARE | Age: 64
Discharge: HOME | End: 2019-08-08
Payer: COMMERCIAL

## 2019-08-08 VITALS
TEMPERATURE: 97.3 F | RESPIRATION RATE: 18 BRPM | DIASTOLIC BLOOD PRESSURE: 87 MMHG | HEART RATE: 74 BPM | SYSTOLIC BLOOD PRESSURE: 136 MMHG

## 2019-08-08 PROCEDURE — 97597 DBRDMT OPN WND 1ST 20 CM/<: CPT

## 2019-08-08 PROCEDURE — 74011000250 HC RX REV CODE- 250: Performed by: OTOLARYNGOLOGY

## 2019-08-08 RX ADMIN — Medication: at 11:00

## 2019-08-08 NOTE — PROGRESS NOTES
[address] [place] [address] PROGRESS NOTE    Name:  Mallory Garcia  MR#:  [id number]  :  1955  ACCOUNT #:  [de-identified]  DATE OF SERVICE:  2019      SUBJECTIVE:  The patient returns for treatment of a laceration without foreign body of the left leg, S81.812D with a venous hypertension ulcer, I87.312, which originally went down to muscle, L97.913, in a type 2 diabetic, E11.62, with secondary lymphedema, I89.0. The patient had a good week. He denies having any problems or changes in medications, allergies, or review of systems. OBJECTIVE:  VITAL SIGNS:  His temperature is 97.3, pulse 74, respirations 18, blood pressure 136/87. WOUND EXAMINATION:  Examination of the left leg shows the edema is much better managed this week since switching him to a 4-layer wrap. The wound dimensions are smaller in all 3 dimensions at 1.4 x 0.6 x 0.5 cm. The tissue is now being bisected by healthy skin in the middle of the wound, so the wound is essentially 2 wounds. There is slough covering the wounds and the wounds remain as soft, spongy granulation tissue. It is recommended that the wounds be debrided and cauterized. I explained the risks and benefits. The patient understood and wished to proceed. Therefore, topical Xylocaine 4% gel was applied for 10 minutes. Using a 5-mm ring curette, the wounds were debrided down to nice healthy bleeding subcutaneous tissue. Hemostasis was achieved with firm pressure until dry. The wounds were then cauterized with silver nitrate until dry. Post debridement dimensions were unchanged since all the changes were within the substance of the stated dimensions which were essentially 2 wounds. ASSESSMENT AND PLAN:  We are going to continue silver foam along with a 4-layer wrap. The patient will keep his legs elevated as much as possible. He will continue to do gastrocnemius muscle exercise hourly while awake.   He has still not yet received his lymphedema pump, but once it arrives, hopefully he can start using that device. All questions were answered. His condition on discharge is stable. He will return in 1 week.         MD NELI [email or web address]  D:  08/08/2019 11:11  T:  08/08/2019 11:19  JOB #:  [id number]

## 2019-08-08 NOTE — WOUND CARE
08/08/19 1103 Wound Leg lower Left;Lateral;Distal  
Date First Assessed/Time First Assessed: 06/20/19 0852   Location: Leg lower  Wound Location Orientation: Left;Lateral;Distal  
Dressing Type Applied Foam;Compression Wrap/Venous Stasis Discharge Condition:stable Ambulatory Status:walking Discharge Destination:home Transportation: private Accompanied by: self

## 2019-08-08 NOTE — WOUND CARE
08/08/19 1001 Wound Leg lower Left;Lateral;Distal  
Date First Assessed/Time First Assessed: 06/20/19 0852   Location: Leg lower  Wound Location Orientation: Left;Lateral;Distal  
Dressing Status Old drainage Dressing Type Compression Wrap/Venous Stasis;Silver products; Foam  
Wound Length (cm) 1.4 cm Wound Width (cm) 0.6 cm Wound Depth (cm) 0.5 cm Wound Volume (cm^3) 0.42 cm^3 Condition of Base Pink;Slough Condition of Edges Rolled/curled Drainage Amount Moderate Drainage Color Serosanguinous Wound Odor None Cleansing and Cleansing Agents  Soap and water Visit Vitals /87 Pulse 74 Temp 97.3 °F (36.3 °C) Resp 18

## 2019-08-15 ENCOUNTER — HOSPITAL ENCOUNTER (OUTPATIENT)
Dept: WOUND CARE | Age: 64
Discharge: HOME | End: 2019-08-15
Payer: COMMERCIAL

## 2019-08-15 VITALS
TEMPERATURE: 98.6 F | HEART RATE: 57 BPM | RESPIRATION RATE: 16 BRPM | DIASTOLIC BLOOD PRESSURE: 84 MMHG | SYSTOLIC BLOOD PRESSURE: 138 MMHG

## 2019-08-15 PROCEDURE — 74011000250 HC RX REV CODE- 250: Performed by: OTOLARYNGOLOGY

## 2019-08-15 PROCEDURE — 97597 DBRDMT OPN WND 1ST 20 CM/<: CPT

## 2019-08-15 RX ADMIN — Medication: at 11:00

## 2019-08-15 NOTE — DISCHARGE INSTRUCTIONS
Discharge Instructions for  [facility]. [address] [facility], [address] [place] [address] Avenue  Telephone: [phone]    NAME:  Bebeto Prior OF BIRTH:  1955  MEDICAL RECORD NUMBER:  [id number]  DATE:  08/15/19    Wound Cleansing:   Do not scrub or use excessive force. Cleanse wound prior to applying a clean dressing with:  [] Normal Saline [x] Keep Wound Dry in Shower    [] Wound Cleanser   [x] Cleanse wound with Mild Soap & Water  [] May Shower at Discharge   [] Other:       Topical Treatments:  Do not apply lotions, creams, or ointments to wound bed unless directed. [x] Apply moisturizing lotion to skin surrounding the wound prior to dressing change.  [] Apply antifungal ointment to skin surrounding the wound prior to dressing change.  [] Apply thin film of moisture barrier ointment to skin immediately around wound. [] Other:       Dressings:           Wound Location left leg  [x] Apply Primary Dressing:       [] MediHoney Gel [] Alginate with Silver [] Alginate   [] Collagen [] Collagen with Silver   [] Santyl with Moisten saline gauze     [] Hydrocolloid   [] MediHoney Alginate [] Foam with Silver   [] Foam   [] Hydrofera Blue    [] Mepilex Border    [] Moisten with Saline [] Hydrogel [] Mepitel     [] Bactroban/Mupirocin [] Polysporin  [x] Other:  Mep ag foam cut to size of wound then cover with more mep ag foam  [] Pack wound loosely with  [] Iodoform   [] Plain Packing  [] Other   [x] Cover and Secure with:     [x] Gauze [] Kristian [] Kerlix   [] Ace Wrap [] Cover Roll Tape [] ABD     [] Other:    Avoid contact of tape with skin. [x] Change dressing: [] Daily    [] Every Other Day [] Three times per week   [x] Once a week [] Do Not Change Dressing   [] Other:    Compression:  Apply: [x] Multilayer Compression Wrap Applied in Clinic 4 layer []RightLeg [x]Left Leg   [x] Multi-layer compression. Do not get leg(s) with wrap wet.   If wraps become too tight call the center or completely remove the wrap. [x] Elevate leg(s) above the level of the heart when sitting. [x] Avoid prolonged standing in one place.     Dietary:  [] Diet as tolerated: [] Calorie Diabetic Diet: [] No Added Salt:  [x] Increase Protein: [] Other:   Activity:  [x] Activity as tolerated:  [] Patient has no activity restrictions     [] Strict Bedrest: [] Remain off Work:     [] May return to full duty work:                                   [] Return to work with restrictions:             Return Appointment:  [] Wound and dressing supply provider:   [] ECF or Home Healthcare:  [] Wound Assessment: [] Physician or NP scheduled for Wound Assessment:   [x] Return Appointment: With Dr. Sheba Guzmán  in  [address] [place] [address])  [] Ordered tests:

## 2019-08-15 NOTE — PROGRESS NOTES
[address] [place] [address] PROGRESS NOTE    Name:  Nelly Girard  MR#:  [id number]  :  1955  ACCOUNT #:  [de-identified]  DATE OF SERVICE:  08/15/2019      SUBJECTIVE:  The patient returns for treatment of a laceration without foreign body of the left leg, SA1.812D which originally extended down to muscle I87.312, L97.923 in a patient with type 2 diabetes E11.622, and secondary lymphedema I89.0. The patient had a very good week. He was up more than he wished and was not able to keep his legs elevated as much as desirable. His lymphedema pump has not yet arrived but hopefully it will arrive today. OBJECTIVE:  VITAL SIGNS:  His temperature is 98.6, pulse 57, respirations 16, and blood pressure 138/84. WOUND EXAMINATION:  The wound dimensions last week were 1.4 x 0.6 x 0.5 cm. Today, they are dramatically improved at 0.6 x 0.6 x 0.2 cm. The wound is still soft and spongy tissue and covered with slough and remains hypertrophic, coming up out of the wound. Therefore, it is recommended that the wound be debrided and cauterized. I explained the risks and benefits. The patient understood and wished to proceed. Therefore, topical Xylocaine 4% gel was applied for 10 minutes. Using a 7-mm ring curette, the wound was debrided of all devitalized tissue. Hemostasis was achieved with gentle pressure until dry. The entire surface of the wound was debrided to create involution of the hypertrophic tissue. ASSESSMENT AND PLAN:  The patient is doing very well on current therapy. Therefore, we are going to continue the exact same regimen of a double layer Mepilex Ag foam followed by a four-layer wrap along with leg elevation, calf muscle exercises hourly while awake, and the use of lymphedema pump once it arrives at his home. All questions were answered. His condition on discharge is stable. He will return in 1 week.       MD NELI [email or web address]  D:  08/15/2019 10:34  T:  08/15/2019 10:42  JOB #:  [id number]

## 2019-08-15 NOTE — DISCHARGE INSTRUCTIONS
Discharge Instructions for  [facility]. [address] [facility], [address] [place] [address] Avenue  Telephone: [phone]    NAME:  Marciano Pineda OF BIRTH:  1955  MEDICAL RECORD NUMBER:  [id number]  DATE:  08/15/19    Wound Cleansing:   Do not scrub or use excessive force. Cleanse wound prior to applying a clean dressing with:  [] Normal Saline [x] Keep Wound Dry in Shower    [] Wound Cleanser   [x] Cleanse wound with Mild Soap & Water  [] May Shower at Discharge   [] Other:       Topical Treatments:  Do not apply lotions, creams, or ointments to wound bed unless directed. [x] Apply moisturizing lotion to skin surrounding the wound prior to dressing change.  [] Apply antifungal ointment to skin surrounding the wound prior to dressing change.  [] Apply thin film of moisture barrier ointment to skin immediately around wound. [] Other:       Dressings:           Wound Location left leg  [x] Apply Primary Dressing:       [] MediHoney Gel [] Alginate with Silver [] Alginate   [] Collagen [] Collagen with Silver   [] Santyl with Moisten saline gauze     [] Hydrocolloid   [] MediHoney Alginate [] Foam with Silver   [] Foam   [] Hydrofera Blue    [] Mepilex Border    [] Moisten with Saline [] Hydrogel [] Mepitel     [] Bactroban/Mupirocin [] Polysporin  [x] Other:  Mep ag foam cut to size of wound then cover with more mep ag foam  [] Pack wound loosely with  [] Iodoform   [] Plain Packing  [] Other   [x] Cover and Secure with:     [x] Gauze [] Kristian [] Kerlix   [] Ace Wrap [] Cover Roll Tape [] ABD     [] Other:    Avoid contact of tape with skin. [x] Change dressing: [] Daily    [] Every Other Day [] Three times per week   [x] Once a week [] Do Not Change Dressing   [] Other:    Compression:  Apply: [x] Multilayer Compression Wrap Applied in Clinic 4 layer []RightLeg [x]Left Leg   [x] Multi-layer compression. Do not get leg(s) with wrap wet.   If wraps become too tight call the center or completely remove the wrap. [x] Elevate leg(s) above the level of the heart when sitting. [x] Avoid prolonged standing in one place. Dietary:  [] Diet as tolerated: [] Calorie Diabetic Diet: [] No Added Salt:  [x] Increase Protein: [] Other:   Activity:  [x] Activity as tolerated:  [] Patient has no activity restrictions     [] Strict Bedrest: [] Remain off Work:     [] May return to full duty work:                                   [] Return to work with restrictions:             Return Appointment:  [] Wound and dressing supply provider:   [] ECF or Home Healthcare:  [] Wound Assessment: [] Physician or NP scheduled for Wound Assessment:   [x] Return Appointment: With Dr. Pasha Roland  in  [address] [place] [address])  [] Ordered tests:         [facility] Information: Should you experience any significant changes in your wound(s) or have questions about your wound care, please contact the [facility] Main at [address] [place] [address] 8:00 am - 4:30. If you need help with your wound outside these hours and cannot wait until we are again available, contact your PCP or go to the hospital emergency room. PLEASE NOTE: IF YOU ARE UNABLE TO OBTAIN WOUND SUPPLIES, CONTINUE TO USE THE SUPPLIES YOU HAVE AVAILABLE UNTIL YOU ARE ABLE TO REACH US. IT IS MOST IMPORTANT TO KEEP THE WOUND COVERED AT ALL TIMES.      Physician Signature:_______________________    Date: ___________ Time:  ____________

## 2019-08-15 NOTE — WOUND CARE
Visit Vitals /84 (BP 1 Location: Right arm, BP Patient Position: Sitting) Pulse (!) 57 Temp 98.6 °F (37 °C) Resp 16  
 
 
 08/15/19 0959 Wound Leg lower Left;Lateral;Distal  
Date First Assessed/Time First Assessed: 06/20/19 0852   Location: Leg lower  Wound Location Orientation: Left;Lateral;Distal  
Dressing Status Breakthrough drainage Dressing Type Foam;Compression Wrap/Venous Stasis Wound Length (cm) 0.6 cm Wound Width (cm) 0.6 cm Wound Depth (cm) 0.2 cm Wound Volume (cm^3) 0.07 cm^3 Condition of Base Annabella;Slough Drainage Amount Small Drainage Color Serosanguinous Wound Odor None Cleansing and Cleansing Agents  Soap and water

## 2019-08-15 NOTE — WOUND CARE
08/15/19 1045   Wound Leg lower Left;Lateral;Distal   Date First Assessed/Time First Assessed: 06/20/19 0852   Location: Leg lower  Wound Location Orientation: Left;Lateral;Distal   Dressing Type Applied 4 x 4;Compression Wrap/Venous Stasis; Foam   Procedure Tolerated Well   Discharge Condition:stable  Ambulatory Status:ambulatory  Discharge Destination:home  Transportation: private auto  Accompanied by: spouse

## 2019-08-22 ENCOUNTER — HOSPITAL ENCOUNTER (OUTPATIENT)
Dept: WOUND CARE | Age: 64
Discharge: HOME | End: 2019-08-22
Payer: COMMERCIAL

## 2019-08-22 VITALS
DIASTOLIC BLOOD PRESSURE: 85 MMHG | SYSTOLIC BLOOD PRESSURE: 135 MMHG | TEMPERATURE: 98.4 F | RESPIRATION RATE: 16 BRPM | HEART RATE: 69 BPM

## 2019-08-22 PROCEDURE — 17250 CHEM CAUT OF GRANLTJ TISSUE: CPT

## 2019-08-22 PROCEDURE — 74011000250 HC RX REV CODE- 250: Performed by: OTOLARYNGOLOGY

## 2019-08-22 RX ADMIN — Medication: at 10:00

## 2019-08-22 NOTE — WOUND CARE
08/22/19 1038   Wound Leg lower Left;Lateral;Distal   Date First Assessed/Time First Assessed: 06/20/19 2036   Location: Leg lower  Wound Location Orientation: Left;Lateral;Distal   Dressing Type Applied Compression Wrap/Venous Stasis; Foam     Discharge Condition:Stable  Ambulatory Status:Walking  Discharge Destination:home  Transportation: private  Accompanied by: Family

## 2019-08-22 NOTE — DISCHARGE INSTRUCTIONS
Discharge Instructions for  [address] [facility], [facility] [address]  Telephone: [phone]    NAME:  Tyrell Rhodes  YOB: 1955  MEDICAL RECORD NUMBER:  [id number]  DATE:  8/22/2019    Wound Cleansing:   Do not scrub or use excessive force. Cleanse wound prior to applying a clean dressing with:  [] Normal Saline [] Keep Wound Dry in Shower    [] Wound Cleanser   [x] Cleanse wound with Mild Soap & Water  [] May Shower at Discharge   [] Other:       Topical Treatments:  Do not apply lotions, creams, or ointments to wound bed unless directed. [] Apply moisturizing lotion to skin surrounding the wound prior to dressing change.  [] Apply antifungal ointment to skin surrounding the wound prior to dressing change.  [] Apply thin film of moisture barrier ointment to skin immediately around wound. [] Other:       Dressings:           Wound Location  Left leg  [] Apply Primary Dressing:       [] MediHoney Gel [] Alginate with Silver [] Alginate   [] Collagen [] Collagen with Silver   [] Santyl with Moisten saline gauze     [] Hydrocolloid   [] MediHoney Alginate [] Foam with Silver   [] Foam   [] Hydrofera Blue    [] Mepilex Border    [] Moisten with Saline [] Hydrogel [] Mepitel     [] Bactroban/Mupirocin [] Polysporin  [x] Other:  Mep ag foam cut to size of wound then cover with more more mep ag foam  [] Pack wound loosely with  [] Iodoform   [] Plain Packing  [] Other   [x] Cover and Secure with:     [x] Gauze [] Kristian [] Kerlix   [] Ace Wrap [] Cover Roll Tape [] ABD     [] Other:    Avoid contact of tape with skin. [] Change dressing: [] Daily    [] Every Other Day [] Three times per week   [x] Once a week [] Do Not Change Dressing   [] Other:         Compression:  Apply: [x] Multilayer Compression Wrap Applied in Clinic 4 layer  []RightLeg [x]Left Leg   [] Multi-layer compression. Do not get leg(s) with wrap wet.   If wraps become too tight call the center or completely remove the wrap. [] Elevate leg(s) above the level of the heart when sitting. [] Avoid prolonged standing in one place. Dietary:  [x] Diet as tolerated: [] Calorie Diabetic Diet: [] No Added Salt:  [] Increase Protein: [] Other:   Activity:  [x] Activity as tolerated:  [] Patient has no activity restrictions     [] Strict Bedrest: [] Remain off Work:     [] May return to full duty work:                                   [] Return to work with restrictions:             Return Appointment:  [] Wound and dressing supply provider:   [] ECF or Home Healthcare:  [] Wound Assessment: [] Physician or NP scheduled for Wound Assessment:   [x] Return Appointment: With Dois Hatchet   in  [facility])  [] Ordered tests:      Electronically signed on 8/22/2019 at 10:03 AM     Leticia Martinez 281: Should you experience any significant changes in your wound(s) or have questions about your wound care, please contact the [facility] Main at [address] [place] [address] 8:00 am - 4:30. If you need help with your wound outside these hours and cannot wait until we are again available, contact your PCP or go to the hospital emergency room. PLEASE NOTE: IF YOU ARE UNABLE TO OBTAIN WOUND SUPPLIES, CONTINUE TO USE THE SUPPLIES YOU HAVE AVAILABLE UNTIL YOU ARE ABLE TO REACH US. IT IS MOST IMPORTANT TO KEEP THE WOUND COVERED AT ALL TIMES.      Physician Signature:_______________________    Date: ___________ Time:  ____________

## 2019-08-29 ENCOUNTER — HOSPITAL ENCOUNTER (OUTPATIENT)
Dept: WOUND CARE | Age: 64
Discharge: HOME | End: 2019-08-29
Payer: COMMERCIAL

## 2019-08-29 VITALS
DIASTOLIC BLOOD PRESSURE: 83 MMHG | RESPIRATION RATE: 16 BRPM | HEART RATE: 67 BPM | SYSTOLIC BLOOD PRESSURE: 137 MMHG | TEMPERATURE: 98.4 F

## 2019-08-29 PROCEDURE — 74011000250 HC RX REV CODE- 250: Performed by: OTOLARYNGOLOGY

## 2019-08-29 PROCEDURE — 10060 I&D ABSCESS SIMPLE/SINGLE: CPT

## 2019-08-29 RX ADMIN — Medication: at 11:00

## 2019-08-29 NOTE — PROGRESS NOTES
[address] [place] [address] PROGRESS NOTE    Name:  Rashad Schultz  MR#:  [id number]  :  1955  ACCOUNT #:  [de-identified]  DATE OF SERVICE:  2019      SUBJECTIVE:  The patient returns for treatment of a laceration without foreign body of the left leg, S81.812D which originally extended down to muscle L89.923 in a patient with type 2 diabetes E11.622, and secondary lymphedema I89.0. The patient has been using his lymphedema pump twice a day. He has had no problems since last seen and denies any changes in medications, allergies, or review of systems. OBJECTIVE:  VITAL SIGNS:  His temperature is 98.4, pulse 67, respirations 16, and blood pressure 137/83. WOUND EXAMINATION:  The wound dimensions today are improved at 0.5 x 0.4 x 0.1 cm. There is nice thin epithelium going in from the periphery, but the wound itself is slightly under pressure and the tissue is protruding out of the wound. It was gently milked with a Q-tip and 2 drops of purulent material extruded. It was elected to simply open this to make sure this small inclusion cyst was completely evacuated. Therefore, topical Xylocaine 4% gel was applied for 10 minutes. I explained the risks and benefits. The patient understood and wished to proceed. Therefore, using the tip of the #10 blade, this was cut in a longitudinal manner and no further pus extruded. The wound was then milked and there was no further pus. ASSESSMENT AND PLAN:  We are going to continue a double layer of Mepilex silver foam over the wound followed by a 4-layer wrap. The patient will keep his legs elevated. He will do calf muscle exercise hourly while awake. He will use his lymphedema pump twice a day. He will return in 1 week for a nursing visit. If the wound has healed, we will simply apply a nonadherent dressing at that time and I will see the patient again the following week.   He is due to leave [facility] on  for a scuba diving trip and we are working diligently in attempt to get this wound healed by the time he leaves on his trip. All questions were answered. His condition on discharge is stable.       Rocco Lam MD      [email or web address]  D:  08/29/2019 10:43  T:  08/29/2019 10:51  JOB #:  [id number]

## 2019-08-29 NOTE — DISCHARGE INSTRUCTIONS
Discharge Instructions for  [address] [facility], [address] [place] [address] Avenue  Telephone: [phone]    NAME:  Nedra Scheuermann  YOB: 1955  MEDICAL RECORD NUMBER:  [id number]  DATE:  8/29/2019    Wound Cleansing:   Do not scrub or use excessive force. Cleanse wound prior to applying a clean dressing with:  [] Normal Saline [] Keep Wound Dry in Shower    [] Wound Cleanser   [x] Cleanse wound with Mild Soap & Water  [] May Shower at Discharge   [] Other:       Topical Treatments:  Do not apply lotions, creams, or ointments to wound bed unless directed. [x] Apply moisturizing lotion to skin surrounding the wound prior to dressing change.  [] Apply antifungal ointment to skin surrounding the wound prior to dressing change.  [] Apply thin film of moisture barrier ointment to skin immediately around wound. [] Other:       Dressings:           Wound Location left lower leg  [x] Apply Primary Dressing:       [] MediHoney Gel [] Alginate with Silver [] Alginate   [] Collagen [] Collagen with Silver   [] Santyl with Moisten saline gauze     [] Hydrocolloid   [] MediHoney Alginate [] Foam with Silver   [] Foam   [] Hydrofera Blue    [] Mepilex Border    [] Moisten with Saline [] Hydrogel [] Mepitel     [] Bactroban/Mupirocin [] Polysporin  [x] Other: mepilex ag to fit wound then cover with larger mep ag piece   [x] Cover and Secure with:     [x] Gauze [] Kristian [] Kerlix   [] Ace Wrap [] Cover Roll Tape [] ABD     [] Other:    Avoid contact of tape with skin.   [x] Change dressing: [] Daily    [] Every Other Day [] Three times per week   [x] Once a week [] Do Not Change Dressing   [] Other:      [] Change dressing: [] Daily    [] Every Other Day [] Three times per week   [] Once a week [] Do Not Change Dressing   [] Other:       Compression:  Apply: [x] Multilayer Compression Wrap Applied in Clinic 4 layer []RightLeg [x]Left Leg   [x] Multi-layer compression. Do not get leg(s) with wrap wet. If wraps become too tight call the center or completely remove the wrap. [x] Elevate leg(s) above the level of the heart when sitting. [x] Avoid prolonged standing in one place. Dietary:  [x] Diet as tolerated: [] Calorie Diabetic Diet: [] No Added Salt:  [x] Increase Protein: [] Other:   Activity:  [x] Activity as tolerated:  [] Patient has no activity restrictions     [] Strict Bedrest: [] Remain off Work:     [] May return to full duty work:                                   [] Return to work with restrictions:             Return Appointment:  [] Wound and dressing supply provider:   [] ECF or Home Healthcare:  [x] Wound Assessment: 1 week for RN visit for wrap change [] Physician or NP scheduled for Wound Assessment:   [x] Return Appointment: With Dr. Chelsi Celeste  in  [address] [place] [address])  [] Ordered tests:      Electronically signed on 8/29/2019 at 10:36 AM     [address] [facility] Information: Should you experience any significant changes in your wound(s) or have questions about your wound care, please contact the [facility] Main at [address] [place] [address] 8:00 am - 4:30. If you need help with your wound outside these hours and cannot wait until we are again available, contact your PCP or go to the hospital emergency room. PLEASE NOTE: IF YOU ARE UNABLE TO OBTAIN WOUND SUPPLIES, CONTINUE TO USE THE SUPPLIES YOU HAVE AVAILABLE UNTIL YOU ARE ABLE TO REACH US. IT IS MOST IMPORTANT TO KEEP THE WOUND COVERED AT ALL TIMES.       Physician Signature:_______________________    Date: ___________ Time:  ____________

## 2019-08-29 NOTE — WOUND CARE
08/29/19 1006   Wound Leg lower Left;Lateral;Distal   Date First Assessed/Time First Assessed: 06/20/19 9940   Location: Leg lower  Wound Location Orientation: Left;Lateral;Distal   Dressing Status Clean, dry, and intact; Removed   Dressing Type Foam;Compression Wrap/Venous Stasis   Wound Length (cm) 0.5 cm   Wound Width (cm) 0.5 cm   Wound Depth (cm) 0.1 cm   Wound Volume (cm^3) 0.02 cm^3   Condition of Base Pink; Hypergranulation;Granulation   Drainage Amount Small   Drainage Color Serosanguinous   Wound Odor None   Cleansing and Cleansing Agents  Soap and water       Visit Vitals  /83   Pulse 67   Temp 98.4 °F (36.9 °C)   Resp 16

## 2019-08-29 NOTE — WOUND CARE
08/29/19 1101   Wound Leg lower Left;Lateral;Distal   Date First Assessed/Time First Assessed: 06/20/19 4391   Location: Leg lower  Wound Location Orientation: Left;Lateral;Distal   Dressing Type Applied Compression Wrap/Venous Stasis; Foam  (4LW, Mep AG)     Discharge Condition:stable  Ambulatory Status:walking  Discharge Destination:home  Transportation: private  Accompanied by: self

## 2019-09-05 ENCOUNTER — HOSPITAL ENCOUNTER (OUTPATIENT)
Dept: WOUND CARE | Age: 64
Discharge: HOME | End: 2019-09-05
Payer: COMMERCIAL

## 2019-09-05 VITALS
HEART RATE: 67 BPM | TEMPERATURE: 98.5 F | SYSTOLIC BLOOD PRESSURE: 124 MMHG | RESPIRATION RATE: 16 BRPM | DIASTOLIC BLOOD PRESSURE: 82 MMHG

## 2019-09-05 DIAGNOSIS — I87.312 CHRONIC VENOUS HYPERTENSION (IDIOPATHIC) WITH ULCER OF LEFT LOWER EXTREMITY (HCC): ICD-10-CM

## 2019-09-05 DIAGNOSIS — L97.929 CHRONIC VENOUS HYPERTENSION (IDIOPATHIC) WITH ULCER OF LEFT LOWER EXTREMITY (HCC): ICD-10-CM

## 2019-09-05 DIAGNOSIS — L97.923 NON-PRESSURE CHRONIC ULCER OF LEFT LOWER LEG WITH NECROSIS OF MUSCLE (HCC): ICD-10-CM

## 2019-09-05 DIAGNOSIS — S81.812D: ICD-10-CM

## 2019-09-05 PROCEDURE — 29581 APPL MULTLAYER CMPRN SYS LEG: CPT

## 2019-09-05 NOTE — WOUND CARE
09/05/19 1010   Wound Leg lower Left;Lateral;Distal   Date First Assessed/Time First Assessed: 06/20/19 8009   Location: Leg lower  Wound Location Orientation: Left;Lateral;Distal   Dressing Status Clean, dry, and intact   Dressing Type Compression Wrap/Venous Stasis; Foam  (mep ag)   Non-staged Wound Description Full thickness   Condition of Base Granulation   Drainage Amount Scant   Drainage Color Purulent;Sanguinous   Wound Odor None   Colleen-wound Assessment Intact   Cleansing and Cleansing Agents  Soap and water; Chlorhexidine gluconate 4%   Dressing Changed Changed/New   Dressing Type Applied 4 x 4;Compression Wrap/Venous Stasis; Foam  (mep ag)   Procedure Tolerated Well   Discharge Condition:stable  Ambulatory Status:ambulatory  Discharge Destination:home  Transportation: private auto  Accompanied by: spouse    Visit Vitals  /82   Pulse 67   Temp 98.5 °F (36.9 °C)   Resp 16     RN visit for wrap change

## 2019-09-12 ENCOUNTER — HOSPITAL ENCOUNTER (OUTPATIENT)
Dept: WOUND CARE | Age: 64
Discharge: HOME | End: 2019-09-12
Payer: COMMERCIAL

## 2019-09-12 VITALS
HEART RATE: 64 BPM | RESPIRATION RATE: 16 BRPM | TEMPERATURE: 98 F | SYSTOLIC BLOOD PRESSURE: 146 MMHG | DIASTOLIC BLOOD PRESSURE: 81 MMHG

## 2019-09-12 PROCEDURE — 99212 OFFICE O/P EST SF 10 MIN: CPT

## 2019-09-12 PROCEDURE — 74011000250 HC RX REV CODE- 250: Performed by: OTOLARYNGOLOGY

## 2019-09-12 RX ADMIN — Medication: at 10:00

## 2019-09-12 NOTE — DISCHARGE INSTRUCTIONS
Discharge Instructions for  [facility]. [address] [facility], [address] [place] [address] Avenue  Telephone: [phone]    NAME:  Ezequiel Soriano  YOB: 1955  MEDICAL RECORD NUMBER:  [id number]  DATE:  9/12/2019    Wound Cleansing:   Do not scrub or use excessive force. Cleanse wound prior to applying a clean dressing with:  [] Normal Saline [] Keep Wound Dry in Shower    [] Wound Cleanser   [] Cleanse wound with Mild Soap & Water  [] May Shower at Discharge   [x] Other:   Cleanse with betadine     Dressings:           Wound Location left lower leg  [x] Apply Primary Dressing:       [] MediHoney Gel [] Alginate with Silver [] Alginate   [] Collagen [] Collagen with Silver   [] Santyl with Moisten saline gauze     [] Hydrocolloid   [] MediHoney Alginate [] Foam with Silver   [] Foam   [] Hydrofera Blue    [] Mepilex Border    [] Moisten with Saline [] Hydrogel [] Mepitel     [] Bactroban/Mupirocin [] Polysporin  [x] Other: mepag      [x] Cover and Secure with:     [x] Gauze [] Kristian [x] Kerlix   [] Ace Wrap [x] Cover Roll Tape [] ABD     [x] Other: bolster with folded 4x4 or 2x2   Avoid contact of tape with skin. [x] Change dressing: [x] Daily    [] Every Other Day [] Three times per week   [] Once a week [] Do Not Change Dressing   [] Other:     Edema Control:  Apply: [x] Compression Stocking [x]Right Leg [x]Left Leg   [] Tubigrip []Right Leg Double Layer []Left Leg Double Layer       []Right Leg Single Layer []Left Leg Single Layer   [] SpandaGrip []Right Leg  []Left Leg      []Low compression 5-10 mm/Hg      []Medium compression 10-20 mm/Hg     []High compression  20-30 mm/Hg   every morning immediately when getting up should be applied to affected leg(s) from mid foot to knee making sure to cover the heel. Remove every night before going to bed. [x] Elevate leg(s) above the level of the heart when sitting. [x] Avoid prolonged standing in one place.    [] Elevate arm/hand above the level of the heart []RightArm []LeftArm     Dietary:  [x] Diet as tolerated: [] Calorie Diabetic Diet: [] No Added Salt:  [x] Increase Protein: [] Other:   Activity:  [x] Activity as tolerated:  [] Patient has no activity restrictions     [] Strict Bedrest: [] Remain off Work:     [] May return to full duty work:                                   [] Return to work with restrictions:             Return Appointment:  [] Wound and dressing supply provider:   [] ECF or Home Healthcare:  [] Wound Assessment: [] Physician or NP scheduled for Wound Assessment:   [x] Return Appointment: With Dr. Abby Rojas  in  [facility])  [] Ordered tests:      Electronically signed on 9/12/2019 at 10:03 AM     Leticia Martinez 281: Should you experience any significant changes in your wound(s) or have questions about your wound care, please contact the [facility] Main at [address] [place] [address] 8:00 am - 4:30. If you need help with your wound outside these hours and cannot wait until we are again available, contact your PCP or go to the hospital emergency room. PLEASE NOTE: IF YOU ARE UNABLE TO OBTAIN WOUND SUPPLIES, CONTINUE TO USE THE SUPPLIES YOU HAVE AVAILABLE UNTIL YOU ARE ABLE TO REACH US. IT IS MOST IMPORTANT TO KEEP THE WOUND COVERED AT ALL TIMES.       Physician Signature:_______________________    Date: ___________ Time:  ____________

## 2019-09-12 NOTE — PROGRESS NOTES
[address] [place] [address] PROGRESS NOTE    Name:  Stephanie Mcfarlane  MR#:  [id number]  :  1955  ACCOUNT #:  [de-identified]  DATE OF SERVICE:  2019      SUBJECTIVE:  The patient returns for treatment of a laceration without foreign body of the left leg, S81.812D, which goes down to subcutaneous tissue, L97.922, in a type 2 diabetic, E11.622, with secondary lymphedema, I89.0. I last saw the patient 2 weeks ago. He had a small inclusion cyst with inspissated secretions and pus which was incised and drained. He had a nursing visit a week ago. Again, there was purulent material.  This was opened by the nurse and was scrubbed inside with a foam tip Q-tip with Betadine solution. There had been no other changes noted in the patient's medications, allergies, or review of systems since last seen. OBJECTIVE:  VITAL SIGNS:  His temperature today is 98, pulse 64, respirations 16, blood pressure 146/81. WOUND EXAMINATION:  The wound today is 1 x 1 x 0.2 cm. There is a small opening in the center of the wound. The area was probed with a foam tip Q-tip. There is no purulent material.  It is nice healthy granulation tissue, but it is just very thin atrophic skin over the wound and it is not in need of debridement. ASSESSMENT AND PLAN:  We are going to apply a bolster dressing to this area in the form of Mepilex silver foam followed by stacked 2 x 2 gauzes followed by an Ace wrap. The patient will use his compression stockings. He will keep his leg elevated. He will use his lymphedema pump twice a day. He is going to go scuba diving in the [place] over the next week and half. I am asking him to protect this area to keep it dry and to not go into the shower, swimming pool, or [email or web address]. He promises to comply. We will see him again in 2 weeks. All questions were answered. His condition on discharge is stable.         MD NELI Kirkpatrick/[email or web address]  D:  2019 11:13  T:  09/12/2019 11:21  JOB #:  [id number]

## 2019-09-12 NOTE — PROGRESS NOTES
Clinic Level of Care Assessment    NAME:  Karen Cristobal  YOB: 1955 GENDER: male  MEDICAL RECORD NUMBER:  [id number]   DATE:  9/12/2019      Wound Count Document in [address] [place] [address]  Number of Wounds Assessed Points   No Wounds/Ulcers []   0   Less than Three Wounds/Ulcers [x]   1   3-6 Wounds/Ulcers []   2   Greater than 6 Wounds/Ulcers []   3     Ambulation Status Document in Coord/WILDA/Mobility tab  Status Definition Points   Independent Independently able to ambulate. Fully able (without any assistance) to get on/off exam table/chair. [x]   0   Minimal Physical Assistance Requires physical assistance of one person to ambulate and/or position patient to be examined. Includes necessary physical assistance to position lower extremities on/off stool. []   1   Moderate Physical Assistance Requires at least one staff member to physically assist patient in ambulating into treatment room, and on/off exam table. []   2   Full Assistance Requires assistance of at least two staff members to transfer patient into treatment room and/or on/off exam table/chair. \"Total Transfer\". []   3     Dressing Complexity Document in LDA and Write Appropriate Order  Complexity Definition Points   No Dressing  []   0   Simple Minimal, simple dressing. i.e. Band-aid, gauze, simple wrap. [x]   1   Intermediate Moderately complicated requiring licensed personnel to apply i.e. collagen matrix, ointments, gels, alginates. []   2   Complex Complicated requiring licensed personnel to apply dressings 6 or more wounds. []   3     Teaching Effort Document in Education Tab   Effort Definition Points   No Teaching  []   0   Simple Reinforce two or less topics. Document in Education navigator. [x]   1   Intermediate Reinforce three to five topics and/or one additional   new topic. Document in Education navigator. []   2   Complex Teach more than one new topic.  New patient information   packet reviewed and/or reinforce more than three topics. Document in Education navigator. HBO initial instruction. []   3       Patient Assessment and Planning  Planning Definition Points   Simple Multiple System Simple: Simple follow-up with routine assessment and planning. If Discharged, instructions and long term/follow-up care given to patient/caregiver. Discharged, instructions and/or After Visit Summary given to patient/caregiver and instructions completed. [x]   1   Intermediate Multiple System Intermediate: Contact with outside resources; i.e. Telephone calls to home health, [facility]. May include filling out forms and writing letters, arranging transportation, communication with insurance , vendors, etc.  Discharged, instructions and/or After Visit Summary given to patient/caregiver and instructions completed. []   2   Complex Multiple System Complex: Full, comprehensive assessment and planning. Follow the entire navigator under Wound Visit charting filling out each tab which includes OP Adm Database Screening, Education and CarePlan  HBO risk assessment completed. Discharged, instructions and/or After Visit Summary given to patient/caregiver and instructions completed.    []   3           Is this the Patient's First Visit to the [address] [place] [address] Road  No      Is this Patient Established @ [facility]  Yes             Clinical Level of Care      Points  0-2  Level 1 []     Points  3-5  Level 2 [x]     Points  6-9  Level 3 []     Points  10-12  Level 4 []     Points  13-15  Level 5 []       Electronically signed by Desiree Malin RN on 9/12/2019 at 10:08 AM

## 2019-09-12 NOTE — WOUND CARE
09/12/19 1020   Wound Leg lower Left;Lateral;Distal   Date First Assessed/Time First Assessed: 06/20/19 5562   Location: Leg lower  Wound Location Orientation: Left;Lateral;Distal   Dressing Type Applied Foam;Gauze;Gauze wrap (santos)       Discharge Condition:stable  Ambulatory Status: ambulatory  Discharge Destination: home  Transportation:  Personal car  Accompanied by:  wife

## 2019-09-12 NOTE — WOUND CARE
Visit Vitals  /81 (BP 1 Location: Right arm, BP Patient Position: Sitting)   Pulse 64   Temp 98 °F (36.7 °C)   Resp 16        09/12/19 0946   Wound Leg lower Left;Lateral;Distal   Date First Assessed/Time First Assessed: 06/20/19 1741   Location: Leg lower  Wound Location Orientation: Left;Lateral;Distal   Dressing Status Clean, dry, and intact   Dressing Type Compression Wrap/Venous Stasis   Non-staged Wound Description Full thickness   Wound Length (cm) 1 cm   Wound Width (cm) 1 cm   Wound Depth (cm) 0.2 cm   Wound Volume (cm^3) 0.2 cm^3   Condition of Base Granulation   Drainage Amount Small   Drainage Color Serous   Wound Odor None   Colleen-wound Assessment Intact   Cleansing and Cleansing Agents  Soap and water

## 2019-09-17 NOTE — PROGRESS NOTES
08/22/19 0947   Wound Leg lower Left;Lateral;Distal   Date First Assessed/Time First Assessed: 06/20/19 1647   Location: Leg lower  Wound Location Orientation: Left;Lateral;Distal   Dressing Status Breakthrough drainage   Dressing Type Compression dressing; Foam   Wound Length (cm) 0.6 cm   Wound Width (cm) 0.6 cm   Wound Depth (cm) 0.1 cm   Wound Volume (cm^3) 0.04 cm^3   Condition of Base Pink   Drainage Amount Small   Drainage Color Serosanguinous   Wound Odor None   Cleansing and Cleansing Agents  Soap and water     Visit Vitals  /85   Pulse 69   Temp 98.4 °F (36.9 °C)   Resp 16
201 [address] [place] [address] PROGRESS NOTE    Name:  Belinda Escalante  MR#:  [id number]  :  1955  ACCOUNT #:  [de-identified]  DATE OF SERVICE:  2019    SUBJECTIVE:  The patient returns for a laceration without foreign body of the left leg S81.812D, which originally went down to muscle L89.923 in a type 2 diabetic E11.622 with secondary lymphedema I89.0    The patient had a good week. He had no problems. No changes in his medications, allergies, or review of systems. His lymphedema pump has arrived and he has been using it twice a day. There have been no other changes noted in his medications, allergies, or review of systems. OBJECTIVE:  VITAL SIGNS:  His temperature is 98.4, pulse 69, respirations 16, blood pressure 135/85. WOUND EXAMINATION:  The wound of the distal left leg today is smaller at 0.6 x 0.4 x 0.1 cm. It is filled with soft, spongy hypergranular tissue. It is clean and not in need of debridement. I recommended that the wound be cauterized. I explained the risks and benefits. The patient understood and wished to proceed. Therefore, topical Xylocaine was applied for 10 minutes. Using a single stick of silver nitrate, the wound and the edges of the wound were cauterized to create involution of the hypergranular tissue. This was then irrigated clear with saline. ASSESSMENT AND PLAN:  We are going to continue with double layer of Mepilex silver foam over the wound, followed by a four-layer wrap along with leg elevation, calf muscle exercises, and the lymphedema pump twice a day and I will see him again in followup in 1 week. All questions were answered. His condition on discharge is stable.       Armando Quintana MD      [id number]  D:  2019 11:34  T:  2019 11:43  JOB #:  [id number]
Improving
normal (ped)...

## 2019-09-26 ENCOUNTER — HOSPITAL ENCOUNTER (OUTPATIENT)
Dept: WOUND CARE | Age: 64
Discharge: HOME | End: 2019-09-26
Payer: COMMERCIAL

## 2019-09-26 VITALS
RESPIRATION RATE: 16 BRPM | SYSTOLIC BLOOD PRESSURE: 136 MMHG | DIASTOLIC BLOOD PRESSURE: 84 MMHG | TEMPERATURE: 98.2 F | HEART RATE: 64 BPM

## 2019-09-26 PROCEDURE — 74011000250 HC RX REV CODE- 250: Performed by: OTOLARYNGOLOGY

## 2019-09-26 PROCEDURE — 97597 DBRDMT OPN WND 1ST 20 CM/<: CPT

## 2019-09-26 RX ADMIN — Medication: at 11:00

## 2019-09-26 NOTE — PROGRESS NOTES
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 The patient was last seen 2 weeks ago. Since then, he has been on a scuba diving trip down to the [place]. He felt like the wound was doing very well until he returned to work 3 days ago. He has been standing for hours at a time and in spite of using his compression stockings, he is concerned that the wound has gotten larger. There have been no other changes noted in his medications, allergies, or review of systems. OBJECTIVE:  VITAL SIGNS:  His temperature is 98.2, pulse 64, respirations 16, blood pressure 136/84. WOUND EXAMINATION:  The wound dimensions last time were 1 x 1 x 0.2 cm. Today, they are 1.6 x 1.8 x 0.1 cm since the wound is open posteriorly where he had the primary initial original wound. The wound is soft, spongy, poorly matured tissue. It is recommended that the wound be debrided and cauterized to try to remove all devitalized tissue and to stimulate healthy growth. Therefore, topical Xylocaine 4% gel was applied for 10 minutes. I explained the risks and benefits. The patient understood and wished to proceed. Using a 5-mm ring curette, the surface of the wound was debrided and the edges were gently stripped out to get back to healthy actively growing skin edges. The surface of the wound was then debrided in its entirety with a single stitch of silver nitrate to try to toughen up and firm up the spongy wound base. Post-debridement dimensions were 1.7 x 2.0 x 0.1 cm. The wound was then scrubbed with chlorhexidine followed by normal saline.     ASSESSMENT AND PLAN: We are going to apply Endoform followed by a 4-layer wrap. I have asked the patient to keep his leg elevated as much as possible, to do calf muscle exercises and to continue to use his lymphedema pump twice a day. Since the patient's wound is taking so long to heal, I am going to refer him to the Vascular Surgery office where he had previous venous duplex with reflux showing significant  reflux of the left leg. Hopefully, they will be able to help him, not only to help this wound to heal but to minimize the chance of this wound recurring. All questions were answered. His condition on discharge is stable.       MD NELI [email or web address]  D:  09/26/2019 11:22  T:  09/26/2019 13:11  JOB #:  [id number]

## 2019-09-26 NOTE — WOUND CARE
09/26/19 3249 Wound Leg lower Left;Lateral;Distal  
Date First Assessed/Time First Assessed: 06/20/19 0852   Location: Leg lower  Wound Location Orientation: Left;Lateral;Distal  
Dressing Status Breakthrough drainage;Removed Dressing Type Gauze; Aquacel Non-staged Wound Description Full thickness Wound Length (cm) 1.6 cm Wound Width (cm) 1.8 cm Wound Depth (cm) 0.1 cm Wound Volume (cm^3) 0.29 cm^3 Condition of Base Granulation Drainage Amount Moderate Drainage Color Serosanguinous; Sanguinous Wound Odor None Colleen-wound Assessment Intact Cleansing and Cleansing Agents  Normal saline Visit Vitals /84 Pulse 64 Temp 98.2 °F (36.8 °C) Resp 16

## 2019-09-26 NOTE — WOUND CARE
09/26/19 1040 Wound Leg lower Left;Lateral;Distal  
Date First Assessed/Time First Assessed: 06/20/19 0852   Location: Leg lower  Wound Location Orientation: Left;Lateral;Distal  
Dressing Type Applied Collagens/cell matrix;Gauze; Compression Wrap/Venous Stasis Discharge Condition:stable Ambulatory Status: ambulatory Discharge Destination: home Transportation:  Personal car Accompanied by: Wife

## 2019-09-26 NOTE — DISCHARGE INSTRUCTIONS
Discharge Instructions for  [address] [facility], [address] [place] [address] Avenue  Telephone: [phone]    NAME:  Nedra Scheuermann  YOB: 1955  MEDICAL RECORD NUMBER:  [id number]  DATE:  9/26/2019    Wound Cleansing:   Do not scrub or use excessive force. Cleanse wound prior to applying a clean dressing with:  [x] Normal Saline [] Keep Wound Dry in Shower    [] Wound Cleanser   [] Cleanse wound with Mild Soap & Water  [] May Shower at Discharge   [] Other:       Topical Treatments:  Do not apply lotions, creams, or ointments to wound bed unless directed. [x] Apply moisturizing lotion to skin surrounding the wound prior to dressing change.  [] Apply antifungal ointment to skin surrounding the wound prior to dressing change.  [] Apply thin film of moisture barrier ointment to skin immediately around wound. [] Other:       Dressings:           Wound Location left ankle  [x] Apply Primary Dressing: [] Other:  endoform   [x] Cover and Secure with:     [x] Gauze  [] Change dressing: [] Daily    [] Every Other Day [] Three times per week   [x] Once a week [] Do Not Change Dressing   [] Other:    Compression:  Apply: [x] Multilayer Compression Wrap Applied in Clinic 4 layer  []RightLeg [x]Left Leg   [] Multi-layer compression. Do not get leg(s) with wrap wet. If wraps become too tight call the center or completely remove the wrap. [x] Elevate leg(s) above the level of the heart when sitting. [x] Avoid prolonged standing in one place.      Dietary:  [x] Diet as tolerated: [] Calorie Diabetic Diet: [] No Added Salt:  [x] Increase Protein: [] Other:   Activity:  [x] Activity as tolerated:  [] Patient has no activity restrictions     [] Strict Bedrest: [] Remain off Work:     [] May return to full duty work:                                   [] Return to work with restrictions:             Return Appointment:  [] Wound and dressing supply provider: [] ECF or Home Healthcare:  [] Wound Assessment: [] Physician or NP scheduled for Wound Assessment:   [x] Return Appointment: With Franc Das  in 1 [facility])  [] Ordered tests:      Electronically signed on 9/26/2019 at 10:07 AM     Leticia Martinez 281: Should you experience any significant changes in your wound(s) or have questions about your wound care, please contact the [facility] Main at [address] [place] [address] 8:00 am - 4:30. If you need help with your wound outside these hours and cannot wait until we are again available, contact your PCP or go to the hospital emergency room. PLEASE NOTE: IF YOU ARE UNABLE TO OBTAIN WOUND SUPPLIES, CONTINUE TO USE THE SUPPLIES YOU HAVE AVAILABLE UNTIL YOU ARE ABLE TO REACH US. IT IS MOST IMPORTANT TO KEEP THE WOUND COVERED AT ALL TIMES.       Physician Signature:_______________________    Date: ___________ Time:  ____________

## 2019-10-03 ENCOUNTER — HOSPITAL ENCOUNTER (OUTPATIENT)
Dept: WOUND CARE | Age: 64
Discharge: HOME | End: 2019-10-03
Payer: COMMERCIAL

## 2019-10-03 VITALS
SYSTOLIC BLOOD PRESSURE: 135 MMHG | TEMPERATURE: 98.1 F | RESPIRATION RATE: 18 BRPM | DIASTOLIC BLOOD PRESSURE: 85 MMHG | HEART RATE: 69 BPM

## 2019-10-03 PROCEDURE — 11042 DBRDMT SUBQ TIS 1ST 20SQCM/<: CPT

## 2019-10-03 PROCEDURE — 74011000250 HC RX REV CODE- 250: Performed by: OTOLARYNGOLOGY

## 2019-10-03 RX ADMIN — Medication: at 11:00

## 2019-10-03 NOTE — WOUND CARE
10/03/19 1011 Wound Leg lower Left;Lateral;Distal  
Date First Assessed/Time First Assessed: 06/20/19 0852   Location: Leg lower  Wound Location Orientation: Left;Lateral;Distal  
Dressing Status Old drainage Dressing Type Compression Wrap/Venous Stasis Non-staged Wound Description Full thickness Wound Length (cm) 1.2 cm Wound Width (cm) 0.7 cm Wound Depth (cm) 0.9 cm Wound Volume (cm^3) 0.76 cm^3 Condition of Base Granulation;Slough Drainage Amount Moderate Drainage Color Serosanguinous Wound Odor None Colleen-wound Assessment Intact Cleansing and Cleansing Agents  Soap and water;Normal saline Visit Vitals /85 Pulse 69 Temp 98.1 °F (36.7 °C) Resp 18

## 2019-10-03 NOTE — PROGRESS NOTES
[address] [place] [address] PROGRESS NOTE    Name:  Elsa Massey  MR#:  [id number]  :  1955  ACCOUNT #:  [de-identified]  DATE OF SERVICE:  10/03/2019      SUBJECTIVE:  The patient returns for treatment of a laceration without foreign body of the left leg, S81.812D, L97.922 in a type 2 diabetic, E11.622, with secondary lymphedema, I89.0. Last week, the patient's wound had gotten worse after he had been standing for a prolonged time while at work. This week, he took it easy and mostly kept his leg elevated. We changed the dressing to Endoform and a 4-layer wrap last week after he had been using his own FarrowWraps. He is due to see the Vascular Surgeon later today. OBJECTIVE:  VITAL SIGNS:  His temperature is 98.1, pulse 69, respirations 18, blood pressure 135/85. WOUND EXAMINATION:  The wound dimensions last week were 1.6 x 1.8 x 0.1 cm. Today, they were measured by the nurse at 1.2 x 0.7 x 0.9 cm. To my eye and measurements, they were 1.2 x 0.7 x 0.1 cm. The subcutaneous tissue was not nearly as spongy as before. There was no fluid accumulation and the wound appeared to be doing quite well. It was recommended that the wound be debrided of all devitalized tissue and cauterized with silver nitrate to help create involution of the hypergranular tissue. I explained the risks and benefits. The patient understood and wished to proceed. Therefore, topical Xylocaine 4% gel was applied for 10 minutes. Using a compressed 5-mm ring curette, the wound was debrided of all devitalized tissue. Hemostasis was achieved with gentle pressure until dry. The edges of the wound and the surface of the wound were then lightly cauterized with silver nitrate which was irrigated clear with saline. ASSESSMENT AND PLAN:  The patient is doing very well on current therapy.   Therefore, we are going to continue Endoform and a 4-layer wrap, leg elevation, gastrocnemius muscle exercises and the use of his lymphedema pump twice a day. The patient will see the vascular surgeons today for consideration of an EVLT for the  reflux. All questions were answered. His condition on discharge is stable. He will return in 1 week for a nursing visit since each visit each week is costing him $250 and I would like to minimize his out-of-pocket expenses. I will see him again in 2 weeks.         Kristie Ayoub MD      [id number]  D:  10/03/2019 11:31  T:  10/03/2019 12:37  JOB #:  [id number]

## 2019-10-03 NOTE — WOUND CARE
10/03/19 1011 Wound Leg lower Left;Lateral;Distal  
Date First Assessed/Time First Assessed: 06/20/19 0852   Location: Leg lower  Wound Location Orientation: Left;Lateral;Distal  
Dressing Status Old drainage Dressing Type Compression Wrap/Venous Stasis Non-staged Wound Description Full thickness Wound Length (cm) 1.2 cm Wound Width (cm) 1.7 cm Wound Depth (cm) 0.9 cm Wound Volume (cm^3) 1.84 cm^3 Condition of Base Granulation;Slough Drainage Amount Moderate Drainage Color Serosanguinous Wound Odor None Colleen-wound Assessment Intact Cleansing and Cleansing Agents  Soap and water;Normal saline Visit Vitals /85 Pulse 69 Temp 98.1 °F (36.7 °C) Resp 18

## 2019-10-03 NOTE — DISCHARGE INSTRUCTIONS
Discharge Instructions for  [address] [facility], [address] [place] [address] Avenue  Telephone: [phone]    NAME:  Lila Zavala  YOB: 1955  MEDICAL RECORD NUMBER:  [id number]  DATE:  10/3/2019    Wound Cleansing:   Do not scrub or use excessive force. Cleanse wound prior to applying a clean dressing with:  [] Normal Saline [x] Keep Wound Dry in Shower    [] Wound Cleanser   [] Cleanse wound with Mild Soap & Water  [] May Shower at Discharge   [] Other:       Topical Treatments:  Do not apply lotions, creams, or ointments to wound bed unless directed. [x] Apply moisturizing lotion to skin surrounding the wound prior to dressing change.  [] Apply antifungal ointment to skin surrounding the wound prior to dressing change.  [] Apply thin film of moisture barrier ointment to skin immediately around wound. [] Other:       Dressings:           Wound Location left lower leg  [x] Apply Primary Dressing:       [] MediHoney Gel [] Alginate with Silver [] Alginate   [x] Collagen endoform [] Collagen with Silver   [] Santyl with Moisten saline gauze     [] Hydrocolloid   [] MediHoney Alginate [] Foam with Silver   [] Foam   [] Hydrofera Blue    [] Mepilex Border    [] Moisten with Saline [] Hydrogel [] Mepitel     [] Bactroban/Mupirocin [] Polysporin  [] Other:     [x] Cover and Secure with:     [x] Gauze [] Kristian [] Kerlix   [] Ace Wrap [] Cover Roll Tape [] ABD     [] Other:    Avoid contact of tape with skin. [x] Change dressing: [] Daily    [] Every Other Day [] Three times per week   [x] Once a week [] Do Not Change Dressing   [] Other:        Compression:  Apply: [x] Multilayer Compression Wrap Applied in Clinic 4 layer wrap []RightLeg [x]Left Leg   [x] Multi-layer compression. Do not get leg(s) with wrap wet. If wraps become too tight call the center or completely remove the wrap.       [x] Elevate leg(s) above the level of the heart when sitting. [x] Avoid prolonged standing in one place. Dietary:  [x] Diet as tolerated: [] Calorie Diabetic Diet: [] No Added Salt:  [x] Increase Protein: [] Other:   Activity:  [x] Activity as tolerated:  [] Patient has no activity restrictions     [] Strict Bedrest: [] Remain off Work:     [] May return to full duty work:                                   [] Return to work with restrictions:             Return Appointment:  [] Wound and dressing supply provider:   [] ECF or Home Healthcare:  [x] Wound Assessment: RN visit in 1 week for wrap change [] Physician or NP scheduled for Wound Assessment:   [x] Return Appointment: With Dr. Lawanda Everett  in  2 [facility])  [] Ordered tests:      Electronically signed on 10/3/2019 at 10:33 AM     [address] [facility] Information: Should you experience any significant changes in your wound(s) or have questions about your wound care, please contact the [facility] Main at [address] [place] [address] 8:00 am - 4:30. If you need help with your wound outside these hours and cannot wait until we are again available, contact your PCP or go to the hospital emergency room. PLEASE NOTE: IF YOU ARE UNABLE TO OBTAIN WOUND SUPPLIES, CONTINUE TO USE THE SUPPLIES YOU HAVE AVAILABLE UNTIL YOU ARE ABLE TO REACH US. IT IS MOST IMPORTANT TO KEEP THE WOUND COVERED AT ALL TIMES.       Physician Signature:_______________________    Date: ___________ Time:  ____________

## 2019-10-10 ENCOUNTER — HOSPITAL ENCOUNTER (OUTPATIENT)
Dept: WOUND CARE | Age: 64
Discharge: HOME | End: 2019-10-10
Payer: COMMERCIAL

## 2019-10-10 VITALS
TEMPERATURE: 98.5 F | SYSTOLIC BLOOD PRESSURE: 151 MMHG | DIASTOLIC BLOOD PRESSURE: 58 MMHG | HEART RATE: 68 BPM | RESPIRATION RATE: 18 BRPM

## 2019-10-10 PROCEDURE — 29581 APPL MULTLAYER CMPRN SYS LEG: CPT

## 2019-10-10 NOTE — WOUND CARE
10/10/19 8421 Wound Leg lower Left;Lateral;Distal  
Date First Assessed/Time First Assessed: 06/20/19 0852   Location: Leg lower  Wound Location Orientation: Left;Lateral;Distal  
Dressing Status Old drainage Dressing Type Compression Wrap/Venous Stasis Drainage Amount Small Drainage Color Serosanguinous Wound Odor None Colleen-wound Assessment Intact Cleansing and Cleansing Agents  Normal saline; Soap and water Dressing Changed Changed/New Dressing Type Applied Collagens/cell matrix; Compression Wrap/Venous Stasis Procedure Tolerated Well Visit Vitals /58 Pulse 68 Temp 98.5 °F (36.9 °C) Resp 18 Nurse visit, wrap changed. Discharge Condition:Stable Ambulatory Status:Ambulatory Discharge Destination:Home Transportation: Private Auto Accompanied by: Self

## 2019-10-17 ENCOUNTER — HOSPITAL ENCOUNTER (OUTPATIENT)
Dept: WOUND CARE | Age: 64
Discharge: HOME | End: 2019-10-17
Payer: COMMERCIAL

## 2019-10-17 VITALS
DIASTOLIC BLOOD PRESSURE: 82 MMHG | TEMPERATURE: 98 F | RESPIRATION RATE: 18 BRPM | SYSTOLIC BLOOD PRESSURE: 147 MMHG | HEART RATE: 67 BPM

## 2019-10-17 PROCEDURE — 11042 DBRDMT SUBQ TIS 1ST 20SQCM/<: CPT

## 2019-10-17 PROCEDURE — 74011000250 HC RX REV CODE- 250: Performed by: OTOLARYNGOLOGY

## 2019-10-17 RX ADMIN — Medication: at 10:00

## 2019-10-17 NOTE — WOUND CARE
10/17/19 9285 Wound Leg lower Left;Lateral;Distal  
Date First Assessed/Time First Assessed: 06/20/19 0852   Location: Leg lower  Wound Location Orientation: Left;Lateral;Distal  
Dressing Status Old drainage;Removed Dressing Type Gauze; Compression Wrap/Venous Stasis Wound Length (cm) 0.3 cm Wound Width (cm) 0.3 cm Wound Depth (cm) 0.1 cm Wound Volume (cm^3) 0.01 cm^3 Condition of Base Granulation Drainage Amount Small Drainage Color Serosanguinous Wound Odor None Colleen-wound Assessment Intact Cleansing and Cleansing Agents  Soap and water;Normal saline Visit Vitals /82 Pulse 67 Temp 98 °F (36.7 °C) Resp 18

## 2019-10-17 NOTE — PROGRESS NOTES
[address] [place] [address] PROGRESS NOTE    Name:  Brittanie Bucio  MR#:  [id number]  :  1955  ACCOUNT #:  [de-identified]  DATE OF SERVICE:  10/17/2019      OBJECTIVE:  The patient returns for treatment of a laceration without foreign body of the left leg, S81.812D, L97.922 in a type 2 diabetic, E11.622, with secondary lymphedema, I89.0. I last saw the patient on 10/03/2019. The wound measured 1.2 x 0.7 x 0.9 cm. We treated it with a 5-mm ring curette debridement and silver nitrate. We used Endoform along with a four-layer wrap and leg elevation, calf muscle exercises, and lymphedema pump twice a day. He had a nursing visit 1 week ago. The wound was smaller at that point and he has done well since last seen and denies any changes in medications, allergies, or review of systems. OBJECTIVE:  VITAL SIGNS:  His temperature today is 98, pulse 67, respirations 18, blood pressure 147/82. WOUND EXAMINATION:  The wound dimensions today were 0.3 x 0.3 x 0.1 cm. There was a flap of hypergranular tissue coming from the anterior edge of the wound. Once this was lifted, the depth of the wound was 0.7 cm. It was elected to debride the subcutaneous tissue including the undersurface of the flap to try to get this tissue to adhere to the deeper tissue and to cauterize it to create involution of the hypergranular tissue. I explained the risks and benefits. The patient understood and wished to proceed. Therefore, topical Xylocaine 4% gel was applied for 10 minutes. Using a compressed 5-mm ring curette, the wound was debrided down to healthy bleeding subcutaneous tissue. The undersurface of the polypoid flap was debrided also. The undersurface and the surface of the wound were then cauterized with a single stick of silver nitrate to help treat this hypergranular tissue. The flap was then pushed down into its position.   A small piece of Rita was placed into the gap between the posterior edge of the wound and the healthy flap and then a bolster dressing was applied. ASSESSMENT AND PLAN:  We are going to apply a bolster dressing along with a four-layer wrap. The patient will keep his legs elevated. He will do calf muscle exercise hourly while awake. He will use his lymphedema pump twice a day. He will have a nursing visit in 1 week, at which time, we will change the bolster dressing and the wrap, and I will see him again in 2 weeks. All questions were answered. His condition on discharge is stable.         Andrew Mercado MD      [id number]  D:  10/17/2019 10:43  T:  10/17/2019 11:16  JOB #:  [id number]

## 2019-10-17 NOTE — DISCHARGE INSTRUCTIONS
Discharge Instructions for  [address] [facility], [address] [place] [address] Avenue  Telephone: [phone]    NAME:  Theresa Aguilar  YOB: 1955  MEDICAL RECORD NUMBER:  [id number]  DATE:  10/17/2019    Wound Cleansing:   Do not scrub or use excessive force. Cleanse wound prior to applying a clean dressing with:  [] Normal Saline [x] Keep Wound Dry in Shower    [] Wound Cleanser   [x] Cleanse wound with Mild Soap & Water  [] May Shower at Discharge   [] Other:       Topical Treatments:  Do not apply lotions, creams, or ointments to wound bed unless directed. [x] Apply moisturizing lotion to skin surrounding the wound prior to dressing change.  [] Apply antifungal ointment to skin surrounding the wound prior to dressing change.  [] Apply thin film of moisture barrier ointment to skin immediately around wound. [] Other:       Dressings:           Wound Location left lower leg  [x] Apply Primary Dressing:       [] MediHoney Gel [] Alginate with Silver [] Alginate   [] Collagen [] Collagen with Silver patricia ag Applied by MD Becky Clifton   [] Santyl with Moisten saline gauze     [] Hydrocolloid   [] MediHoney Alginate [] Foam with Silver   [] Foam   [] Hydrofera Blue    [] Mepilex Border    [] Moisten with Saline [] Hydrogel [] Mepitel     [] Bactroban/Mupirocin [] Polysporin  [x] Other:   Bolster with folded 2x2's  [x] Cover and Secure with:     [x] Gauze [] Brian Shell [] Kerlix   [] Ace Wrap [] Cover Roll Tape [] ABD     [] Other:    Avoid contact of tape with skin. [x] Change dressing: [] Daily    [] Every Other Day [] Three times per week   [x] Once a week [] Do Not Change Dressing   [] Other:     Compression:  Apply: [x] Multilayer Compression Wrap Applied in Clinic 4 layer wrap []RightLeg [x]Left Leg   [x] Multi-layer compression. Do not get leg(s) with wrap wet. If wraps become too tight call the center or completely remove the wrap.       [x] Elevate leg(s) above the level of the heart when sitting. [x] Avoid prolonged standing in one place    Dietary:  [x] Diet as tolerated: [] Calorie Diabetic Diet: [] No Added Salt:  [x] Increase Protein: [] Other:   Activity:  [x] Activity as tolerated:  [] Patient has no activity restrictions     [] Strict Bedrest: [] Remain off Work:     [] May return to full duty work:                                   [] Return to work with restrictions:             Return Appointment:  [] Wound and dressing supply provider:   [] ECF or Home Healthcare:  [x] Wound Assessment: RN visit in 1 week [] Physician or NP scheduled for Wound Assessment:   [x] Return Appointment: With Dr. Ria Robles  in  2 [facility])  [] Ordered tests:      Electronically signed on 10/17/2019 at 10:17 AM     Leticia Martinez 281: Should you experience any significant changes in your wound(s) or have questions about your wound care, please contact the [facility] Main at [address] [place] [address] 8:00 am - 4:30. If you need help with your wound outside these hours and cannot wait until we are again available, contact your PCP or go to the hospital emergency room. PLEASE NOTE: IF YOU ARE UNABLE TO OBTAIN WOUND SUPPLIES, CONTINUE TO USE THE SUPPLIES YOU HAVE AVAILABLE UNTIL YOU ARE ABLE TO REACH US. IT IS MOST IMPORTANT TO KEEP THE WOUND COVERED AT ALL TIMES.       Physician Signature:_______________________    Date: ___________ Time:  ____________

## 2019-10-24 ENCOUNTER — HOSPITAL ENCOUNTER (OUTPATIENT)
Dept: WOUND CARE | Age: 64
Discharge: HOME | End: 2019-10-24
Payer: COMMERCIAL

## 2019-10-24 VITALS
RESPIRATION RATE: 18 BRPM | SYSTOLIC BLOOD PRESSURE: 149 MMHG | DIASTOLIC BLOOD PRESSURE: 90 MMHG | TEMPERATURE: 98.2 F | HEART RATE: 69 BPM

## 2019-10-24 PROCEDURE — 29581 APPL MULTLAYER CMPRN SYS LEG: CPT

## 2019-10-24 NOTE — WOUND CARE
10/24/19 1038 Wound Leg lower Left;Lateral;Distal  
Date First Assessed/Time First Assessed: 06/20/19 0852   Location: Leg lower  Wound Location Orientation: Left;Lateral;Distal  
Dressing Status Reinforced Dressing Type Compression Wrap/Venous Stasis;Collagens/cell matrix Drainage Amount Small Drainage Color Serosanguinous Wound Odor None Cleansing and Cleansing Agents  Normal saline; Soap and water Dressing Changed Changed/New Dressing Type Applied Collagens/cell matrix; Compression Wrap/Venous Stasis Procedure Tolerated Well Visit Vitals /90 Pulse 69 Temp 98.2 °F (36.8 °C) Resp 18 Discharge Condition: Stable Pain: 0 Ambulatory Status: Walking Discharge Destination: Home Transportation: Car Accompanied by: Self  and Family/Caregiver Discharge instructions reviewed with Patient and Family/Caregiver  and copy or written instructions have been provided. All questions/concerns have been addressed at this time. Nurse visit, dressing and wrap removed and changed.

## 2019-10-31 ENCOUNTER — HOSPITAL ENCOUNTER (OUTPATIENT)
Dept: WOUND CARE | Age: 64
Discharge: HOME | End: 2019-10-31
Payer: COMMERCIAL

## 2019-10-31 VITALS
TEMPERATURE: 98.5 F | RESPIRATION RATE: 18 BRPM | SYSTOLIC BLOOD PRESSURE: 131 MMHG | DIASTOLIC BLOOD PRESSURE: 81 MMHG | HEART RATE: 76 BPM

## 2019-10-31 PROCEDURE — 74011000250 HC RX REV CODE- 250: Performed by: OTOLARYNGOLOGY

## 2019-10-31 PROCEDURE — 17250 CHEM CAUT OF GRANLTJ TISSUE: CPT

## 2019-10-31 RX ADMIN — Medication: at 10:00

## 2019-10-31 NOTE — WOUND CARE
Post Debriedment 10/31/19 1105 Wound Leg lower Left;Lateral;Distal  
Date First Assessed/Time First Assessed: 06/20/19 3315   Location: Leg lower  Wound Location Orientation: Left;Lateral;Distal  
Dressing Changed Changed/New Dressing Type Applied Alginate;Foam;Compression Wrap/Venous Stasis Procedure Tolerated Well

## 2019-10-31 NOTE — PROGRESS NOTES
[address] [place] [address] PROGRESS NOTE    Name:  Elton Garcia  MR#:  [id number]  :  1955  ACCOUNT #:  [de-identified]  DATE OF SERVICE:  10/31/2019      SUBJECTIVE:  The patient returns for treatment of a wound to the left lateral leg which is a laceration without foreign body, S81.812D, which goes down to subcutaneous tissue, L97.922, in a type 2 diabetic, E11.622, and secondary lymphedema I89.0. He is now being seen every 2 weeks, mostly because of financial constraints. I saw him on 10/17/2019. He had a small flap of tissue, and under this, the depth of the wound was 0.7 cm. The flap was brought down into position. The wound was debrided and cauterized and we used Rita into the gap followed by a bolster dressing and a 4-layer wrap and the use of lymphedema pump twice a day. The patient has had a good 2 weeks. He had a nursing visit 1 week ago and he denies having any changes in medications, allergies or review of systems. OBJECTIVE:  VITAL SIGNS:  His temperature today is 98.5, pulse 76, respirations 18, blood pressure 131/81. WOUND EXAMINATION:  The wound today as measured by the nurses was 0.1 x 0.1 x 0.1 cm, in fact it was 1 x 0.8 x 0.1 cm. It is filled with soft spongy, poorly organized tissue which was protruding out of the wound. It was recommended that this tissue be cauterized to facilitate healing. I explained the risks and benefits. The patient understood and wished to proceed. Therefore, topical Xylocaine 4% gel was applied for 10 minutes. Using a single stick of silver nitrate, the entire wound was then cauterized and flattened with external pressure so that tissue was no longer protruding above the wound bed. ASSESSMENT AND PLAN:  We are going to switch to Mepilex silver foam followed by a 4-layer wrap along with leg elevation, calf muscle exercise hourly while awake, and a lymphedema pump twice a day.     The patient is going to be out of town from 11/15/2019 to 12/05/2019, and therefore, I am going to see him again in 1 week from today to see if he is improving with the silver foam and 4-layer wrap. If not, we will switch to a different dressing. We will reapply a 4-layer wrap at next week's visit. He will remove it a week later on 11/15/2019, and use his own external compression along with his lymphedema pump while he is out of town on a hunting trip. All questions were answered. His condition on discharge is stable. He will return in 1 week.         MD NELI [email or web address]  D:  10/31/2019 10:17  T:  10/31/2019 10:48  JOB #:  [id number]

## 2019-10-31 NOTE — WOUND CARE
10/31/19 0945 Wound Leg lower Left;Lateral;Distal  
Date First Assessed/Time First Assessed: 06/20/19 0852   Location: Leg lower  Wound Location Orientation: Left;Lateral;Distal  
Dressing Status Intact Dressing Type Compression dressing;Collagens/cell matrix;Gauze Wound Length (cm) 0.1 cm Wound Width (cm) 0.1 cm Wound Depth (cm) 0.1 cm Wound Surface Area (cm^2) 0.01 cm^2 Wound Volume (cm^3) 0 cm^3 Drainage Amount Small Drainage Color Serosanguinous Wound Odor None Cleansing and Cleansing Agents  Normal saline Visit Vitals /81 Pulse 76 Temp 98.5 °F (36.9 °C) Resp 18

## 2019-10-31 NOTE — DISCHARGE INSTRUCTIONS
Discharge Instructions for  [address] [facility], [address] [place] [address] Avenue  Telephone: [phone]    NAME:  Moo Chou OF BIRTH:  1955  MEDICAL RECORD NUMBER:  [id number]  DATE:  10/31/2019    Wound Cleansing:   Do not scrub or use excessive force. Cleanse wound prior to applying a clean dressing with:  [] Normal Saline [x] Keep Wound Dry in Shower    [] Wound Cleanser   [x] Cleanse wound with Mild Soap & Water  [] May Shower at Discharge   [] Other:       Topical Treatments:  Do not apply lotions, creams, or ointments to wound bed unless directed. [x] Apply moisturizing lotion to skin surrounding the wound prior to dressing change. Dressings:           Wound Location left lower leg  [x] Apply Primary Dressing:  [x] Other:   [id number] AG  [x] Cover and Secure with:     [x] Gauze [] Kristian [] Kerlix   [] Ace Wrap [] Cover Roll Tape [] ABD     [] Other:    Avoid contact of tape with skin. [x] Change dressing: [] Daily    [] Every Other Day [] Three times per week   [x] Once a week [] Do Not Change Dressing   [] Other:     Compression:  Apply: [x] Multilayer Compression Wrap Applied in Clinic 4 layer wrap []RightLeg [x]Left Leg   [x] Multi-layer compression. Do not get leg(s) with wrap wet. If wraps become too tight call the center or completely remove the wrap. [x] Elevate leg(s) above the level of the heart when sitting.     [x] Avoid prolonged standing in one place    Dietary:  [x] Diet as tolerated: [x] Increase Protein:    Activity:  [x] Activity as tolerated:  [] Patient has no activity restrictions     [] Strict Bedrest: [] Remain off Work:     [] May return to full duty work:                                   [] Return to work with restrictions:             Return Appointment:  [] Wound and dressing supply provider:   [] ECF or Home Healthcare:  [x] Wound Assessment: RN visit in 1 week [] Physician or NP scheduled for Wound Assessment:   [x] Return Appointment: With Dr. Cleopatra Sauceda  in  1 [facility])  [] Ordered tests:      Electronically signed on 10/31/2019 at 10:07 AM     Leticia Martinez 281: Should you experience any significant changes in your wound(s) or have questions about your wound care, please contact the [facility] Main at [address] [place] [address] 8:00 am - 4:30. If you need help with your wound outside these hours and cannot wait until we are again available, contact your PCP or go to the hospital emergency room. PLEASE NOTE: IF YOU ARE UNABLE TO OBTAIN WOUND SUPPLIES, CONTINUE TO USE THE SUPPLIES YOU HAVE AVAILABLE UNTIL YOU ARE ABLE TO REACH US. IT IS MOST IMPORTANT TO KEEP THE WOUND COVERED AT ALL TIMES.       Physician Signature:_______________________    Date: ___________ Time:  ____________

## 2019-11-07 ENCOUNTER — HOSPITAL ENCOUNTER (OUTPATIENT)
Dept: WOUND CARE | Age: 64
Discharge: HOME | End: 2019-11-07
Payer: COMMERCIAL

## 2019-11-07 VITALS
DIASTOLIC BLOOD PRESSURE: 88 MMHG | HEART RATE: 67 BPM | RESPIRATION RATE: 18 BRPM | SYSTOLIC BLOOD PRESSURE: 143 MMHG | TEMPERATURE: 97.5 F

## 2019-11-07 DIAGNOSIS — L97.929 CHRONIC VENOUS HYPERTENSION (IDIOPATHIC) WITH ULCER OF LEFT LOWER EXTREMITY (HCC): ICD-10-CM

## 2019-11-07 DIAGNOSIS — I87.312 CHRONIC VENOUS HYPERTENSION (IDIOPATHIC) WITH ULCER OF LEFT LOWER EXTREMITY (HCC): ICD-10-CM

## 2019-11-07 DIAGNOSIS — S81.812D: ICD-10-CM

## 2019-11-07 DIAGNOSIS — L97.923 NON-PRESSURE CHRONIC ULCER OF LEFT LOWER LEG WITH NECROSIS OF MUSCLE (HCC): ICD-10-CM

## 2019-11-07 PROCEDURE — 17250 CHEM CAUT OF GRANLTJ TISSUE: CPT

## 2019-11-07 NOTE — PROGRESS NOTES
11/07/19 1019   Wound Leg lower Left;Lateral;Distal   Date First Assessed/Time First Assessed: 06/20/19 0167   Location: Leg lower  Wound Location Orientation: Left;Lateral;Distal   Dressing Status Removed   Dressing Type Compression Wrap/Venous Stasis   Wound Length (cm) 0.8 cm   Wound Width (cm) 0.4 cm   Wound Depth (cm) 0.1 cm   Wound Surface Area (cm^2) 0.32 cm^2   Wound Volume (cm^3) 0.03 cm^3   Condition of Base Granulation   Condition of Edges Rolled/curled   Drainage Amount Moderate   Drainage Color Green   Wound Odor None   Colleen-wound Assessment Intact   Cleansing and Cleansing Agents  Normal saline     Visit Vitals  /88 (BP 1 Location: Right arm, BP Patient Position: At rest)   Pulse 67   Temp 97.5 °F (36.4 °C)   Resp 18

## 2019-11-08 NOTE — DISCHARGE INSTRUCTIONS
Discharge Instructions for  [address] [facility], [address] [place] [address] Avenue  Telephone: [phone]    NAME:  Fiona Glasgow  YOB: 1955  MEDICAL RECORD NUMBER:  [id number]  DATE:  11/07/2019    Wound Cleansing:   Do not scrub or use excessive force. Cleanse wound prior to applying a clean dressing with:  [] Normal Saline [x] Keep Wound Dry in Shower    [] Wound Cleanser   [x] Cleanse wound with Mild Soap & Water  [] May Shower at Discharge   [] Other:       Topical Treatments:  Do not apply lotions, creams, or ointments to wound bed unless directed. [x] Apply moisturizing lotion to skin surrounding the wound prior to dressing change. Dressings:           Wound Location left lower leg  [x] Apply Primary Dressing:  [x] Other:   Meplix AG  [x] Cover and Secure with:     [x] Gauze [] Kristian [] Kerlix   [] Ace Wrap [] Cover Roll Tape [] ABD     [] Other:    Avoid contact of tape with skin. [x] Change dressing: [] Daily    [] Every Other Day [] Three times per week   [x] Once a week [] Do Not Change Dressing   [] Other:     Compression:  Apply: [x] Multilayer Compression Wrap Applied in Clinic 4 layer wrap []RightLeg [x]Left Leg   [x] Multi-layer compression. Do not get leg(s) with wrap wet. If wraps become too tight call the center or completely remove the wrap. [x] Elevate leg(s) above the level of the heart when sitting.     [x] Avoid prolonged standing in one place    Dietary:  [x] Diet as tolerated: [x] Increase Protein:    Activity:  [x] Activity as tolerated:  [] Patient has no activity restrictions     [] Strict Bedrest: [] Remain off Work:     [] May return to full duty work:                                   [] Return to work with restrictions:             Return Appointment:  [] Wound and dressing supply provider:   [] ECF or Home Healthcare:  [x] Wound Assessment: RN visit in 1 week [] Physician or NP scheduled for Wound Assessment:   [x] Return Appointment: With Dr. Franca Ambrose  in  3 [facility])  [] Ordered tests:      Electronically signed on 11/07/2019 at 10:07 LILA Zuniga Information: Should you experience any significant changes in your wound(s) or have questions about your wound care, please contact the [facility] Main at [address] [place] [address] 8:00 am - 4:30. If you need help with your wound outside these hours and cannot wait until we are again available, contact your PCP or go to the hospital emergency room. PLEASE NOTE: IF YOU ARE UNABLE TO OBTAIN WOUND SUPPLIES, CONTINUE TO USE THE SUPPLIES YOU HAVE AVAILABLE UNTIL YOU ARE ABLE TO REACH US. IT IS MOST IMPORTANT TO KEEP THE WOUND COVERED AT ALL TIMES.   Physician Signature:_______________________    Date: ___________ Time:  ____________    Patient's Signature ___________________    Date: ______________ Time: ____________    Clinician's Signature_____________________    Date:_______________Time: _____________

## 2019-12-05 ENCOUNTER — HOSPITAL ENCOUNTER (OUTPATIENT)
Dept: WOUND CARE | Age: 64
Discharge: HOME | End: 2019-12-05
Payer: COMMERCIAL

## 2019-12-05 VITALS — TEMPERATURE: 98.7 F | HEART RATE: 71 BPM | DIASTOLIC BLOOD PRESSURE: 78 MMHG | SYSTOLIC BLOOD PRESSURE: 132 MMHG

## 2019-12-05 DIAGNOSIS — S81.812D: ICD-10-CM

## 2019-12-05 DIAGNOSIS — I87.312 CHRONIC VENOUS HYPERTENSION (IDIOPATHIC) WITH ULCER OF LEFT LOWER EXTREMITY (HCC): ICD-10-CM

## 2019-12-05 DIAGNOSIS — L97.929 CHRONIC VENOUS HYPERTENSION (IDIOPATHIC) WITH ULCER OF LEFT LOWER EXTREMITY (HCC): ICD-10-CM

## 2019-12-05 DIAGNOSIS — L97.923 NON-PRESSURE CHRONIC ULCER OF LEFT LOWER LEG WITH NECROSIS OF MUSCLE (HCC): ICD-10-CM

## 2019-12-05 PROCEDURE — 17250 CHEM CAUT OF GRANLTJ TISSUE: CPT

## 2019-12-05 NOTE — DISCHARGE INSTRUCTIONS
Discharge Instructions for  [address] [facility], [address] [place] [address] Avenue  Telephone: [phone]    NAME:  Suzan Mike  YOB: 1955  MEDICAL RECORD NUMBER:  [id number]  DATE:  12/05/2019    Wound Cleansing:   Do not scrub or use excessive force. Cleanse wound prior to applying a clean dressing with:  [] Normal Saline [x] Keep Wound Dry in Shower    [] Wound Cleanser   [x] Cleanse wound with Mild Soap & Water  [] May Shower at Discharge   [] Other:       Topical Treatments:  Do not apply lotions, creams, or ointments to wound bed unless directed. [x] Apply moisturizing lotion to skin surrounding the wound prior to dressing change. Dressings:           Wound Location left lower leg  [x] Apply Primary Dressing:  [x] Other:   aquacel ag  [x] Cover and Secure with:     [x] Gauze [] Kristian [] Kerlix   [] Ace Wrap [] Cover Roll Tape [] ABD     [] Other:    Avoid contact of tape with skin. [x] Change dressing: [] Daily    [] Every Other Day [] Three times per week   [x] Once a week [] Do Not Change Dressing   [] Other:     Compression:  Apply: [x] Multilayer Compression Wrap Applied in Clinic 4 layer wrap []RightLeg [x]Left Leg   [x] Multi-layer compression. Do not get leg(s) with wrap wet. If wraps become too tight call the center or completely remove the wrap. [x] Elevate leg(s) above the level of the heart when sitting.     [x] Avoid prolonged standing in one place    Dietary:  [x] Diet as tolerated: [x] Increase Protein:    Activity:  [x] Activity as tolerated:  [] Patient has no activity restrictions     [] Strict Bedrest: [] Remain off Work:     [] May return to full duty work:                                   [] Return to work with restrictions:             Return Appointment:  [] Wound and dressing supply provider:   [] ECF or Home Healthcare:  [x] Wound Assessment: RN visit in 1 week [] Physician or NP scheduled for Wound Assessment:   [x] Return Appointment: With Dr. Lindy Rice  in  3 [facility])  [] Ordered tests:      Electronically signed on 12/05/2019 at 10:39 AM     Leticia Martinez 281: Should you experience any significant changes in your wound(s) or have questions about your wound care, please contact the [address] Main at [address] [place] [address] 8:00 am - 4:30. If you need help with your wound outside these hours and cannot wait until we are again available, contact your PCP or go to the hospital emergency room. PLEASE NOTE: IF YOU ARE UNABLE TO OBTAIN WOUND SUPPLIES, CONTINUE TO USE THE SUPPLIES YOU HAVE AVAILABLE UNTIL YOU ARE ABLE TO REACH US. IT IS MOST IMPORTANT TO KEEP THE WOUND COVERED AT ALL TIMES.   Physician Signature:_______________________    Date: ___________ Time:  ____________    Patient's Signature ___________________    Date: ______________ Time: ____________    Clinician's Signature_____________________    Date:_______________Time: _____________

## 2019-12-05 NOTE — WOUND CARE
12/05/19 7701 Wound Leg lower Left;Lateral;Distal  
Date First Assessed/Time First Assessed: 06/20/19 0852   Location: Leg lower  Wound Location Orientation: Left;Lateral;Distal  
Dressing Status Removed Dressing Type Gauze; Foam  
Wound Length (cm) 0.8 cm Wound Width (cm) 0.7 cm Wound Depth (cm) 0.1 cm Wound Surface Area (cm^2) 0.56 cm^2 Wound Volume (cm^3) 0.06 cm^3 Condition of Base Pink Condition of Edges Open Drainage Amount Small Drainage Color Serosanguinous Wound Odor None Colleen-wound Assessment Maceration; Intact Cleansing and Cleansing Agents  Normal saline Visit Vitals /78 (BP 1 Location: Left arm, BP Patient Position: Sitting) Pulse 71 Temp 98.7 °F (37.1 °C) Photo unable to be obtained due to networking difficulties.

## 2019-12-05 NOTE — WOUND CARE
12/05/19 1004 Wound Leg lower Left;Lateral;Distal  
Date First Assessed/Time First Assessed: 06/20/19 0852   Location: Leg lower  Wound Location Orientation: Left;Lateral;Distal  
Dressing Type Applied Silver products;ABD pad;Compression Wrap/Venous Stasis 
(4 layer wrap) Discharge Condition: Stable Pain: 0 Ambulatory Status: Walking Discharge Destination: Home Transportation: Car Accompanied by: Self Discharge instructions reviewed with Patient and copy or written instructions have been provided. All questions/concerns have been addressed at this time.

## 2019-12-05 NOTE — PROGRESS NOTES
[address] [place] [address] PROGRESS NOTE    Name:  Primo Vásquez  MR#:  [id number]  :  1955  ACCOUNT #:  [de-identified]  DATE OF SERVICE:  2019    SUBJECTIVE:  The patient returns for treatment of a wound of the left lateral leg, which was originally due to a laceration without foreign body, S81.812D, with a venous hypertension ulcer I87.312, which goes down to subcutaneous tissue, L97.922, in a type 2 diabetic, E11.622, with secondary lymphedema, I89.0. To recall his history, at age 34, he had an operation with a plate and 4 screws inserted into his left lateral ankle. 16 years ago, Dr. Breanna Bender removed the plate and most of the screws. The patient believes some of the screw heads came off and the shaft of the screw stayed in place. This is the area underneath his wound. He has had no problems since last seen and denies any changes in medications, allergies or review of systems. OBJECTIVE:  His temperature is 98.1, pulse 71, respirations 18, blood pressure 132/78. WOUND EXAMINATION:  The wound today measures 0.8 x 0.7 x 0.1 cm. It is filled with soft spongy tissue. It was probed and the depth of the wound is actually 0.8 cm. It is elected to cauterize the entire depth of the wound circumferentially to try to treat the soft spongy hypergranular tissue. I explained the risks and benefits. The patient understood and wished to proceed. Therefore, topical Xylocaine was applied for 5 minutes. Using a single stick of silver nitrate, the entire depths of the wound were cauterized circumferentially. This was then irrigated clear with saline. ASSESSMENT AND PLAN:  We are going to change the dressing to a stack of Aquacel Ag to hold pressure over the area. We are going to reapply the 4-layer wrap. The patient will keep his leg elevated. He will do calf muscle exercise hourly while awake and he will return to see us in 1 week.   He understands that if this wound is not dramatically better we are going to refer him to an orthopedist to help us to determine whether or not there is still a residual foreign body, which could be contributing to delayed healing. Dr. Rebekah Murcia has passed away, so we will find a different surgeon. All questions were answered. His condition on discharge is stable. He will return in 1 week.       MD NELI [email or web address]  D:  12/05/2019 11:03  T:  12/05/2019 11:40  JOB #:  [id number]

## 2019-12-12 ENCOUNTER — HOSPITAL ENCOUNTER (OUTPATIENT)
Dept: WOUND CARE | Age: 64
Discharge: HOME | End: 2019-12-12
Payer: COMMERCIAL

## 2019-12-12 VITALS
DIASTOLIC BLOOD PRESSURE: 86 MMHG | HEART RATE: 65 BPM | TEMPERATURE: 98.6 F | SYSTOLIC BLOOD PRESSURE: 144 MMHG | RESPIRATION RATE: 18 BRPM

## 2019-12-12 PROBLEM — I89.0 SECONDARY LYMPHEDEMA: Status: ACTIVE | Noted: 2019-12-12

## 2019-12-12 PROCEDURE — 11042 DBRDMT SUBQ TIS 1ST 20SQCM/<: CPT

## 2019-12-12 NOTE — PROGRESS NOTES
[address] [place] [address] PROGRESS NOTE    Name:  J Carlos Smith  MR#:  [id number]  :  1955  ACCOUNT #:  [de-identified]  DATE OF SERVICE:  2019      SUBJECTIVE:  The patient returns for treatment of a left lateral lower extremity venous leg ulcer which was originally a laceration without foreign body, S81.812D, I87.312, which goes down to subcutaneous tissue, L97.922, in a patient with type 2 diabetes, E11.622, and secondary lymphedema, I89.0. Last week, I cauterized the wound circumferentially because of the tissue was soft and spongy. It had a depth of 0.8 cm. I switched the dressing to Aquacel Ag which was stacked over the wound to serve as a bolster, along with a 4-layer wrap. The patient kept his leg elevated as much as he could. He did calf muscle exercises hourly while awake and use his lymphedema pump twice a day. He had no problems. He denies any changes in medications, allergies, or review of systems. OBJECTIVE:  VITAL SIGNS:  His temperature is 98.6, pulse 65, respirations 18, blood pressure 144/86. WOUND EXAMINATION:  The wound dimensions today are improved to 0.4 x 0.4 x 0.2 cm. The tissue is a little firmer than previously. There is some devitalized tissue into the depths of the wound. It was recommended that this wound be debrided and recauterized. I explained the risks and benefits. The patient understood and wished to proceed. Therefore, using a compressed 5-mm ring curette and forceps with teeth, all devitalized tissue was removed. Post-debridement dimensions were 0.5 x 0.5 x 0.4 cm. The entire wound was then cauterized with silver nitrate which was then irrigated clear with saline. ASSESSMENT AND PLAN:  We are going to continue Aquacel Ag which will be stacked over the wound along with a 4-layer wrap. The patient will continue his regimen of leg elevation, calf muscle exercises, and lymphedema pump.     The patient saw Dr. Lisandra Ayon at [facility] a couple of years ago for the wound in the same area. He discussed with the patient the hardware that was in place and recommended against any intervention at that time. I am asking the patient to see Dr. Raad Morales again just to make sure we are not missing anything and to consider evaluating him for osteomyelitis which could be contributing to the delay in healing. The patient will make the appointment. I will see him again in followup in 1 week. All questions were answered. His condition on discharge is stable.         Prakash Martel MD      [email or web address]  D:  12/12/2019 10:25  T:  12/12/2019 11:47  JOB #:  [id number]  CC:  Quang Roman MD

## 2019-12-12 NOTE — WOUND CARE
12/12/19 1015 Wound Leg lower Left;Lateral;Distal  
Date First Assessed/Time First Assessed: 06/20/19 0852   Location: Leg lower  Wound Location Orientation: Left;Lateral;Distal  
Dressing Changed Changed/New Dressing Type Applied Foam;ABD pad;Unna boot Procedure Tolerated Well Discharge Condition: Stable Pain: 0 Ambulatory Status: Walking Discharge Destination: Home Transportation: Car Accompanied by: Self  and Family/Caregiver Discharge instructions reviewed with Patient and Family/Caregiver  and copy or written instructions have been provided. All questions/concerns have been addressed at this time.

## 2019-12-12 NOTE — DISCHARGE INSTRUCTIONS
Discharge Instructions for  [place] [id number] [facility], [facility] [address]  Telephone: [phone]    NAME:  Yana Dawson OF BIRTH:  1955  MEDICAL RECORD NUMBER:  [id number]  DATE:  12/12/2019    Wound Cleansing:   Do not scrub or use excessive force. Cleanse wound prior to applying a clean dressing with:  [] Normal Saline [x] Keep Wound Dry in Shower    [] Wound Cleanser   [x] Cleanse wound with Mild Soap & Water  [] May Shower at Discharge   [] Other:       Topical Treatments:  Do not apply lotions, creams, or ointments to wound bed unless directed. [x] Apply moisturizing lotion to skin surrounding the wound prior to dressing change. Dressings:           Wound Location left lower leg  [x] Apply Primary Dressing:  [x] Other:   aquacel ag  [x] Cover and Secure with:     [x] Gauze [] Kristian [] Kerlix   [] Ace Wrap [] Cover Roll Tape [] ABD     [] Other:    Avoid contact of tape with skin. [x] Change dressing: [] Daily    [] Every Other Day [] Three times per week   [x] Once a week [] Do Not Change Dressing   [] Other:     Compression:  Apply: [x] Multilayer Compression Wrap Applied in Clinic 4 layer wrap []RightLeg [x]Left Leg   [x] Multi-layer compression. Do not get leg(s) with wrap wet. If wraps become too tight call the center or completely remove the wrap. [x] Elevate leg(s) above the level of the heart when sitting.     [x] Avoid prolonged standing in one place    Dietary:  [x] Diet as tolerated: [x] Increase Protein:    Activity:  [x] Activity as tolerated:  [] Patient has no activity restrictions     [] Strict Bedrest: [] Remain off Work:     [] May return to full duty work:                                   [] Return to work with restrictions:             Return Appointment:  [x] Return Appointment: With Dr. Lukas Mabry  in  1Week(s)      Electronically signed on 12/12/2019 at 10:39 AM by FANTA Tenorio, Sam Torres Information: Should you experience any significant changes in your wound(s) or have questions about your wound care, please contact the [address] at [address] [place] [address] 8:00 am - 4:30. If you need help with your wound outside these hours and cannot wait until we are again available, contact your PCP or go to the hospital emergency room. PLEASE NOTE: IF YOU ARE UNABLE TO OBTAIN WOUND SUPPLIES, CONTINUE TO USE THE SUPPLIES YOU HAVE AVAILABLE UNTIL YOU ARE ABLE TO REACH US. IT IS MOST IMPORTANT TO KEEP THE WOUND COVERED AT ALL TIMES.   Physician Signature:_______________________    Date: ___________ Time:  ____________    Patient's Signature ___________________    Date: ______________ Time: ____________    Clinician's Signature_____________________    Date:_______________Time: _____________

## 2019-12-12 NOTE — WOUND CARE
12/12/19 4287 Wound Leg lower Left;Lateral;Distal  
Date First Assessed/Time First Assessed: 06/20/19 0852   Location: Leg lower  Wound Location Orientation: Left;Lateral;Distal  
Dressing Status Removed Dressing Type Aquacel; Compression Wrap/Venous Stasis;ABD pad Wound Length (cm) 0.4 cm Wound Width (cm) 0.4 cm Wound Depth (cm) 0.2 cm Wound Surface Area (cm^2) 0.16 cm^2 Wound Volume (cm^3) 0.03 cm^3 Condition of Base Pink;Slough Condition of Edges Open Drainage Amount Moderate Drainage Color Serosanguinous;Brown Wound Odor None Colleen-wound Assessment Intact; Pink Cleansing and Cleansing Agents  Soap and water Visit Vitals /86 Pulse 65 Temp 98.6 °F (37 °C) Resp 18 Photo unable to be obtained due to networking difficulties.

## 2019-12-19 ENCOUNTER — HOSPITAL ENCOUNTER (OUTPATIENT)
Dept: WOUND CARE | Age: 64
Discharge: HOME | End: 2019-12-19
Payer: COMMERCIAL

## 2019-12-19 VITALS
TEMPERATURE: 97.9 F | HEART RATE: 56 BPM | SYSTOLIC BLOOD PRESSURE: 132 MMHG | DIASTOLIC BLOOD PRESSURE: 85 MMHG | RESPIRATION RATE: 18 BRPM

## 2019-12-19 PROCEDURE — 11042 DBRDMT SUBQ TIS 1ST 20SQCM/<: CPT

## 2019-12-19 NOTE — WOUND CARE
12/19/19 1036 Wound Leg lower Left;Lateral;Distal  
Date First Assessed/Time First Assessed: 06/20/19 0852   Location: Leg lower  Wound Location Orientation: Left;Lateral;Distal  
Dressing Status Removed; Old drainage Dressing Type Compression Wrap/Venous Stasis;ABD pad; Foam 
(mepilex AG) Wound Length (cm) 0.3 cm Wound Width (cm) 0.4 cm Wound Depth (cm) 0.3 cm Wound Surface Area (cm^2) 0.12 cm^2 Wound Volume (cm^3) 0.04 cm^3 Condition of Base Pink;Slough Condition of Edges Open Drainage Amount Moderate Drainage Color Serosanguinous Wound Odor None Colleen-wound Assessment Intact Cleansing and Cleansing Agents  Normal saline Dressing Type Applied 4 x 4;Compression Wrap/Venous Stasis;Foam;Other (Comment) (Hydrofera Blue Rope, gauze, 4 layer compression wrap.) Discharge Condition: Stable Pain: 0 Ambulatory Status: Walking Discharge Destination: Home Transportation: Car Accompanied by: Self Discharge instructions reviewed with Patient and copy or written instructions have been provided. All questions/concerns have been addressed at this time.

## 2019-12-19 NOTE — DISCHARGE INSTRUCTIONS
Discharge Instructions for  [place] [address] [facility], [address]  Telephone: [phone]    NAME:  Asya Fitch  YOB: 1955  MEDICAL RECORD NUMBER:  [id number]  DATE:  12/19/2019    Wound Cleansing:   Do not scrub or use excessive force. Cleanse wound prior to applying a clean dressing with:   Keep Wound Dry in Shower    [] Wound Cleanser   [x] Cleanse wound with Mild Soap & Water     Topical Treatments:  Do not apply lotions, creams, or ointments to wound bed unless directed. [x] Apply moisturizing lotion to skin surrounding the wound prior to dressing change. Dressings:           Wound Location left lower leg  [x] Apply Primary Dressing:  [x] Other:   Hydrofera blue rope`  [x] Cover and Secure with:     [x] Gauze    Avoid contact of tape with skin. [x] Change dressing: [x] Once a week     Compression:  Apply: [x] Multilayer Compression Wrap Applied in Clinic 4 layer wrap []RightLeg [x]Left Leg   [x] Multi-layer compression. Do not get leg(s) with wrap wet. If wraps become too tight call the center or completely remove the wrap. [x] Elevate leg(s) above the level of the heart when sitting. [x] Avoid prolonged standing in one place    Dietary:  [x] Diet as tolerated: [x] Increase Protein:    Activity:  [x] Activity as tolerated:  :             Return Appointment:  [x] Return Appointment: With Dr. Bekah Estes  in  1 [facility])      Electronically signed on 12/19/2019 at 10:49 AM by FANTA Bauman, [place] Information: Should you experience any significant changes in your wound(s) or have questions about your wound care, please contact the [address] [place] [address] at [address] [place] [address] 8:00 am - 4:30. If you need help with your wound outside these hours and cannot wait until we are again available, contact your PCP or go to the hospital emergency room.      PLEASE NOTE: IF YOU ARE UNABLE TO OBTAIN WOUND SUPPLIES, CONTINUE TO USE THE SUPPLIES YOU HAVE AVAILABLE UNTIL YOU ARE ABLE TO REACH US. IT IS MOST IMPORTANT TO KEEP THE WOUND COVERED AT ALL TIMES.   Physician Signature:_______________________    Date: ___________ Time:  ____________    Patient's Signature ___________________    Date: ______________ Time: ____________    Clinician's Signature_____________________    Date:_______________Time: _____________

## 2019-12-19 NOTE — PROGRESS NOTES
[address] [place] [address] PROGRESS NOTE    Name:  Sheron Montalvo  MR#:  [id number]  :  1955  ACCOUNT #:  [de-identified]  DATE OF SERVICE:  2019      SUBJECTIVE:  The patient returns for treatment of a wound to the left lateral leg which was originally a laceration without foreign body, S81.812D, which goes down to subcutaneous tissue, L97.922, in a type 2 diabetic E11.622, with a venous hypertension component I87.312. The patient was seen a week ago. The wound was smaller, so we continued Aquacel Ag and a 4-layer wrap along with leg elevation, calf muscle exercises, and a lymphedema pump twice a day. At our request, the patient contacted Dr. Yolanda España for an appointment and is due to be seen on 2020. There have been no other changes noted in the patient's medications, allergies or review of systems. OBJECTIVE:  VITAL SIGNS:  His temperature is 97.9, pulse 56, respirations 18, blood pressure 132/85. WOUND EXAMINATION:  Examination of the wound shows a wound of 0.3 x 0.4 x 0.3 cm. The opening of the wound is filled with a polypoid mass. It was elected to remove this since it has been present previously and has not contributed to wound healing. Using 4% Xylocaine gel for 10 minutes for anesthetic purposes, the wound was then anesthetized. I explained the risks and benefits of wound debridement. The patient understood and wished to proceed. Therefore, using a compressed 5-mm ring curette, the polypoid lesion was removed. The wound was then probed deeply and the new depth of the wound is now 1.4 cm and I can feel either hard bone or hardware. I called Dr. Cleve Garvey' office to see if the patient could be seen sooner. They are going to fit him in on 01/15/2020, at 2:40 p.m. Dr. Cleve Garvey will call me because I would like to know if it would help him if I ordered an MRI ahead of time or if he wished to see the patient first and make his own determination.   I will see the patient again in followup in 1 week. I packed the wound with Hydrofera blue rope which was cut to size and was placed into the depths of the wound and brought out onto the skin edge. The leg was then wrapped with gauze and a 4-layer wrap. ASSESSMENT AND PLAN:  The patient will continue leg exercises, leg elevation, and the lymphedema pump twice a day. He will return in 1 week.         Clementina Oliva MD      [email or web address]  D:  12/19/2019 11:51  T:  12/19/2019 12:11  JOB #:  [id number]  CC:  Balwinder Quinones MD

## 2019-12-19 NOTE — WOUND CARE
12/19/19 1036 Wound Leg lower Left;Lateral;Distal  
Date First Assessed/Time First Assessed: 06/20/19 0852   Location: Leg lower  Wound Location Orientation: Left;Lateral;Distal  
Dressing Status Removed; Old drainage Dressing Type Compression Wrap/Venous Stasis;ABD pad; Foam 
(mepilex AG) Wound Length (cm) 0.3 cm Wound Width (cm) 0.4 cm Wound Depth (cm) 0.3 cm Wound Surface Area (cm^2) 0.12 cm^2 Wound Volume (cm^3) 0.04 cm^3 Condition of Base Pink;Slough Condition of Edges Open Drainage Amount Moderate Drainage Color Serosanguinous Wound Odor None Colleen-wound Assessment Intact Visit Vitals /85 Pulse (!) 56 Temp 97.9 °F (36.6 °C) Resp 18

## 2019-12-26 ENCOUNTER — HOSPITAL ENCOUNTER (OUTPATIENT)
Dept: WOUND CARE | Age: 64
Discharge: HOME | End: 2019-12-26
Payer: COMMERCIAL

## 2019-12-26 VITALS — SYSTOLIC BLOOD PRESSURE: 136 MMHG | RESPIRATION RATE: 18 BRPM | DIASTOLIC BLOOD PRESSURE: 87 MMHG | TEMPERATURE: 98.2 F

## 2019-12-26 PROCEDURE — 29581 APPL MULTLAYER CMPRN SYS LEG: CPT

## 2019-12-26 NOTE — WOUND CARE
12/26/19 1105 Wound Leg lower Left;Lateral;Distal  
Date First Assessed/Time First Assessed: 06/20/19 0852   Location: Leg lower  Wound Location Orientation: Left;Lateral;Distal  
Dressing Type Applied Foam;Compression Wrap/Venous Stasis Discharge Condition: Stable Pain: 0 Ambulatory Status: Walking Discharge Destination: Home Transportation: Car Accompanied by: Self  and Family/Caregiver Discharge instructions reviewed with Patient and Family/Caregiver  and copy or written instructions have been provided. All questions/concerns have been addressed at this time.

## 2019-12-26 NOTE — DISCHARGE INSTRUCTIONS
Discharge Instructions for  [address] [facility], [address]  Telephone: [phone]    NAME:  Batsheva Jo OF BIRTH:  1955  MEDICAL RECORD NUMBER:  [id number]  DATE:  12/26/2019    Wound Cleansing:   Do not scrub or use excessive force. Cleanse wound prior to applying a clean dressing with:   Keep Wound Dry in Shower    [] Wound Cleanser   [x] Cleanse wound with Mild Soap & Water     Topical Treatments:  Do not apply lotions, creams, or ointments to wound bed unless directed. [x] Apply moisturizing lotion to skin surrounding the wound prior to dressing change. Dressings:           Wound Location left lower leg  [x] Apply Primary Dressing:  [x] Other:   Hydrofera blue rope`  [x] Cover and Secure with:     [x] Gauze    Avoid contact of tape with skin. [x] Change dressing: [x] Once a week     Compression:  Apply: [x] Multilayer Compression Wrap Applied in Clinic 4 layer wrap []RightLeg [x]Left Leg   [x] Multi-layer compression. Do not get leg(s) with wrap wet. If wraps become too tight call the center or completely remove the wrap. [x] Elevate leg(s) above the level of the heart when sitting. [x] Avoid prolonged standing in one place    Dietary:  [x] Diet as tolerated: [x] Increase Protein:    Activity:  [x] Activity as tolerated:  :             Return Appointment:  [x] Return Appointment: With Dr. Lexy Islas  in  1 [facility])      Electronically signed on 12/26/2019 at 11:01 AM by FANTA Guzman [facility] Information: Should you experience any significant changes in your wound(s) or have questions about your wound care, please contact the [address] [place] [address] at [address] [place] [address] 8:00 am - 4:30. If you need help with your wound outside these hours and cannot wait until we are again available, contact your PCP or go to the hospital emergency room.      PLEASE NOTE: IF YOU ARE UNABLE TO OBTAIN WOUND SUPPLIES, CONTINUE TO USE THE SUPPLIES YOU HAVE AVAILABLE UNTIL YOU ARE ABLE TO REACH US. IT IS MOST IMPORTANT TO KEEP THE WOUND COVERED AT ALL TIMES.   Physician Signature:_______________________    Date: ___________ Time:  ____________    Patient's Signature ___________________    Date: ______________ Time: ____________    Clinician's Signature_____________________    Date:_______________Time: _____________

## 2019-12-26 NOTE — WOUND CARE
12/26/19 1030 Wound Leg lower Left;Lateral;Distal  
Date First Assessed/Time First Assessed: 06/20/19 0852   Location: Leg lower  Wound Location Orientation: Left;Lateral;Distal  
Dressing Status Removed Dressing Type Compression Wrap/Venous Stasis 
(4 layer) Wound Length (cm) 1.1 cm Wound Width (cm) 0.9 cm Wound Depth (cm) 0.6 cm Wound Surface Area (cm^2) 0.99 cm^2 Wound Volume (cm^3) 0.59 cm^3 Condition of Base Pink Condition of Edges Open Drainage Amount Moderate Drainage Color Serosanguinous;Brown Wound Odor None Colleen-wound Assessment Intact Cleansing and Cleansing Agents  Chlorhexidine gluconate 4% Visit Vitals /87 Temp 98.2 °F (36.8 °C) Resp 18

## 2019-12-26 NOTE — PROGRESS NOTES
[address] [place] [address] PROGRESS NOTE    Name:  Savannah Gallegos  MR#:  [id number]  :  1955  ACCOUNT #:  [de-identified]  DATE OF SERVICE:  2019      SUBJECTIVE:  The patient returns for treatment of a laceration without foreign body of the left leg S81.812D, which goes down to subcutaneous tissue L97.922, in a patient with type 2 diabetes E11.622, with a venous hypertension ulcer I87.312. Last week we probed the wound down to hardware over the bone showing the depths of the wound is now L97.924. We placed the Hydrofera Blue rope into the wound followed by a 4-layer wrap. I spoke to Dr. Ita Miller during the week and he does not recommend that I get any x-rays or MRIs, and he will get everything that is needed when he sees the patient on 2020. The patient had no problems over the week and denies any changes in medications, allergies or review of systems. OBJECTIVE:  VITAL SIGNS:  His temperature is 98.2, pulse 60, respirations 18, blood pressure 136/87. WOUND EXAMINATION:  The wound measures 1.1 x 0.9 x 0.6 cm. There is still regrowth of the polypoid tissue. The wound was probed down to hardware with a depth of 1.4 cm. A new piece of Hydrofera Blue rope was placed into the depths of the wound and a new 4-layer wrap was applied. ASSESSMENT AND PLAN:  The patient will keep his leg elevated. He will do calf muscle exercises hourly while awake. He will return in 1 week for a nursing visit and will return to see me in 2 weeks. What we are doing is placing Hydrofera Blue rope into the depths of the wound to help keep the opening opened and to minimize the chance of fluid accumulation and also to keep a bacteriostatic dressing down into the depths of the wound. All questions were answered. Condition on discharge is stable.       MD NELI [email or web address]  D:  2019 11:14  T:  2019 12:47  JOB #:  [id number]

## 2020-01-02 ENCOUNTER — HOSPITAL ENCOUNTER (OUTPATIENT)
Dept: WOUND CARE | Age: 65
Discharge: HOME | End: 2020-01-02
Payer: COMMERCIAL

## 2020-01-02 VITALS
DIASTOLIC BLOOD PRESSURE: 84 MMHG | HEART RATE: 59 BPM | SYSTOLIC BLOOD PRESSURE: 143 MMHG | TEMPERATURE: 97.4 F | OXYGEN SATURATION: 18 %

## 2020-01-02 PROCEDURE — 29581 APPL MULTLAYER CMPRN SYS LEG: CPT

## 2020-01-02 NOTE — WOUND CARE
01/02/20 1039   Wound Leg lower Left;Lateral;Distal   Date First Assessed/Time First Assessed: 06/20/19 4958   Location: Leg lower  Wound Location Orientation: Left;Lateral;Distal   Dressing Type Applied Compression Wrap/Venous Stasis;Gauze  (HFBR rope)   Discharge Condition: Stable     Pain: 0    Ambulatory Status: Walking    Discharge Destination: Home     Transportation: Car    Accompanied by: Self     Discharge instructions reviewed with Patient and copy or written instructions have been provided. All questions/concerns have been addressed at this time.

## 2021-03-16 ENCOUNTER — TRANSCRIBE ORDER (OUTPATIENT)
Dept: REGISTRATION | Age: 66
End: 2021-03-16

## 2021-03-16 ENCOUNTER — HOSPITAL ENCOUNTER (OUTPATIENT)
Dept: PREADMISSION TESTING | Age: 66
Discharge: HOME | End: 2021-03-16
Payer: MEDICARE

## 2021-03-16 DIAGNOSIS — Z01.812 PRE-PROCEDURE LAB EXAM: Primary | ICD-10-CM

## 2021-03-16 DIAGNOSIS — Z01.812 PRE-PROCEDURE LAB EXAM: ICD-10-CM

## 2021-03-16 PROCEDURE — U0003 INFECTIOUS AGENT DETECTION BY NUCLEIC ACID (DNA OR RNA); SEVERE ACUTE RESPIRATORY SYNDROME CORONAVIRUS 2 (SARS-COV-2) (CORONAVIRUS DISEASE [COVID-19]), AMPLIFIED PROBE TECHNIQUE, MAKING USE OF HIGH THROUGHPUT TECHNOLOGIES AS DESCRIBED BY CMS-2020-01-R: HCPCS

## 2021-03-17 LAB — SARS-COV-2, COV2NT: NOT DETECTED

## 2021-03-19 ENCOUNTER — HOSPITAL ENCOUNTER (OUTPATIENT)
Age: 66
Setting detail: OUTPATIENT SURGERY
Discharge: HOME | End: 2021-03-19
Attending: SPECIALIST | Admitting: SPECIALIST
Payer: MEDICARE

## 2021-03-19 ENCOUNTER — ANESTHESIA (OUTPATIENT)
Dept: ENDOSCOPY | Age: 66
End: 2021-03-19
Payer: MEDICARE

## 2021-03-19 ENCOUNTER — ANESTHESIA EVENT (OUTPATIENT)
Dept: ENDOSCOPY | Age: 66
End: 2021-03-19
Payer: MEDICARE

## 2021-03-19 VITALS
HEART RATE: 61 BPM | OXYGEN SATURATION: 98 % | HEIGHT: 76 IN | TEMPERATURE: 98 F | SYSTOLIC BLOOD PRESSURE: 163 MMHG | WEIGHT: 294.5 LBS | BODY MASS INDEX: 35.86 KG/M2 | RESPIRATION RATE: 17 BRPM | DIASTOLIC BLOOD PRESSURE: 88 MMHG

## 2021-03-19 PROCEDURE — 74011250637 HC RX REV CODE- 250/637: Performed by: SPECIALIST

## 2021-03-19 PROCEDURE — 76040000019: Performed by: SPECIALIST

## 2021-03-19 PROCEDURE — 88305 TISSUE EXAM BY PATHOLOGIST: CPT

## 2021-03-19 PROCEDURE — 74011250636 HC RX REV CODE- 250/636: Performed by: SPECIALIST

## 2021-03-19 PROCEDURE — 74011250636 HC RX REV CODE- 250/636: Performed by: NURSE ANESTHETIST, CERTIFIED REGISTERED

## 2021-03-19 PROCEDURE — 77030021593 HC FCPS BIOP ENDOSC BSC -A: Performed by: SPECIALIST

## 2021-03-19 PROCEDURE — 74011000250 HC RX REV CODE- 250: Performed by: NURSE ANESTHETIST, CERTIFIED REGISTERED

## 2021-03-19 PROCEDURE — 76060000031 HC ANESTHESIA FIRST 0.5 HR: Performed by: SPECIALIST

## 2021-03-19 RX ORDER — SODIUM CHLORIDE 9 MG/ML
INJECTION, SOLUTION INTRAVENOUS
Status: DISCONTINUED | OUTPATIENT
Start: 2021-03-19 | End: 2021-03-19 | Stop reason: HOSPADM

## 2021-03-19 RX ORDER — NALOXONE HYDROCHLORIDE 0.4 MG/ML
0.4 INJECTION, SOLUTION INTRAMUSCULAR; INTRAVENOUS; SUBCUTANEOUS
Status: DISCONTINUED | OUTPATIENT
Start: 2021-03-19 | End: 2021-03-19 | Stop reason: HOSPADM

## 2021-03-19 RX ORDER — LIDOCAINE HYDROCHLORIDE 20 MG/ML
INJECTION, SOLUTION EPIDURAL; INFILTRATION; INTRACAUDAL; PERINEURAL AS NEEDED
Status: DISCONTINUED | OUTPATIENT
Start: 2021-03-19 | End: 2021-03-19 | Stop reason: HOSPADM

## 2021-03-19 RX ORDER — FLUMAZENIL 0.1 MG/ML
0.2 INJECTION INTRAVENOUS
Status: DISCONTINUED | OUTPATIENT
Start: 2021-03-19 | End: 2021-03-19 | Stop reason: HOSPADM

## 2021-03-19 RX ORDER — NAPROXEN 250 MG/1
TABLET ORAL 2 TIMES DAILY WITH MEALS
COMMUNITY

## 2021-03-19 RX ORDER — EPHEDRINE SULFATE/0.9% NACL/PF 50 MG/5 ML
SYRINGE (ML) INTRAVENOUS
Status: COMPLETED
Start: 2021-03-19 | End: 2021-03-19

## 2021-03-19 RX ORDER — DEXTROMETHORPHAN/PSEUDOEPHED 2.5-7.5/.8
1.2 DROPS ORAL
Status: DISCONTINUED | OUTPATIENT
Start: 2021-03-19 | End: 2021-03-22 | Stop reason: HOSPADM

## 2021-03-19 RX ORDER — SODIUM CHLORIDE 9 MG/ML
50 INJECTION, SOLUTION INTRAVENOUS CONTINUOUS
Status: DISCONTINUED | OUTPATIENT
Start: 2021-03-19 | End: 2021-03-19 | Stop reason: HOSPADM

## 2021-03-19 RX ORDER — PROPOFOL 10 MG/ML
INJECTION, EMULSION INTRAVENOUS AS NEEDED
Status: DISCONTINUED | OUTPATIENT
Start: 2021-03-19 | End: 2021-03-19 | Stop reason: HOSPADM

## 2021-03-19 RX ORDER — TRAMADOL HYDROCHLORIDE 50 MG/1
50 TABLET ORAL
COMMUNITY

## 2021-03-19 RX ORDER — EPINEPHRINE 0.1 MG/ML
1 INJECTION INTRACARDIAC; INTRAVENOUS
Status: DISCONTINUED | OUTPATIENT
Start: 2021-03-19 | End: 2021-03-20 | Stop reason: HOSPADM

## 2021-03-19 RX ORDER — GLYCOPYRROLATE 0.2 MG/ML
INJECTION INTRAMUSCULAR; INTRAVENOUS AS NEEDED
Status: DISCONTINUED | OUTPATIENT
Start: 2021-03-19 | End: 2021-03-19 | Stop reason: HOSPADM

## 2021-03-19 RX ORDER — EPHEDRINE SULFATE/0.9% NACL/PF 50 MG/5 ML
SYRINGE (ML) INTRAVENOUS AS NEEDED
Status: DISCONTINUED | OUTPATIENT
Start: 2021-03-19 | End: 2021-03-19 | Stop reason: HOSPADM

## 2021-03-19 RX ORDER — SODIUM CHLORIDE 0.9 % (FLUSH) 0.9 %
5-40 SYRINGE (ML) INJECTION AS NEEDED
Status: DISCONTINUED | OUTPATIENT
Start: 2021-03-19 | End: 2021-03-22 | Stop reason: HOSPADM

## 2021-03-19 RX ORDER — SODIUM CHLORIDE 0.9 % (FLUSH) 0.9 %
5-40 SYRINGE (ML) INJECTION EVERY 8 HOURS
Status: DISCONTINUED | OUTPATIENT
Start: 2021-03-19 | End: 2021-03-22 | Stop reason: HOSPADM

## 2021-03-19 RX ORDER — ATROPINE SULFATE 0.1 MG/ML
0.5 INJECTION INTRAVENOUS
Status: DISCONTINUED | OUTPATIENT
Start: 2021-03-19 | End: 2021-03-20 | Stop reason: HOSPADM

## 2021-03-19 RX ADMIN — LIDOCAINE HYDROCHLORIDE 50 MG: 20 INJECTION, SOLUTION EPIDURAL; INFILTRATION; INTRACAUDAL; PERINEURAL at 12:57

## 2021-03-19 RX ADMIN — PROPOFOL 50 MG: 10 INJECTION, EMULSION INTRAVENOUS at 13:04

## 2021-03-19 RX ADMIN — SODIUM CHLORIDE: 900 INJECTION, SOLUTION INTRAVENOUS at 12:53

## 2021-03-19 RX ADMIN — Medication 5 MG: at 13:09

## 2021-03-19 RX ADMIN — GLYCOPYRROLATE 0.2 MG: 0.2 INJECTION, SOLUTION INTRAMUSCULAR; INTRAVENOUS at 13:04

## 2021-03-19 RX ADMIN — Medication 5 MG: at 13:15

## 2021-03-19 RX ADMIN — PROPOFOL 50 MG: 10 INJECTION, EMULSION INTRAVENOUS at 13:01

## 2021-03-19 RX ADMIN — PROPOFOL 50 MG: 10 INJECTION, EMULSION INTRAVENOUS at 13:08

## 2021-03-19 RX ADMIN — PROPOFOL 50 MG: 10 INJECTION, EMULSION INTRAVENOUS at 12:58

## 2021-03-19 RX ADMIN — PROPOFOL 50 MG: 10 INJECTION, EMULSION INTRAVENOUS at 13:13

## 2021-03-19 RX ADMIN — PROPOFOL 100 MG: 10 INJECTION, EMULSION INTRAVENOUS at 12:57

## 2021-03-19 NOTE — PROCEDURES
[address]  [address]                 Colonoscopy Procedure Note    Indications:   See Preoperative Diagnosis above  Referring Physician: Saamntha Lee MD  Anesthesia/Sedation: MAC anesthesia Propofol  Endoscopist:  Dr. Mary Fairchild  Assistant:  Endoscopy Technician-1: Clyde Crawford IV  Endoscopy RN-1: Gustabo Montano RN  Preoperative diagnosis: COLON SCREENING  Postoperative diagnosis: colon polyp, diverticulosis    Procedure in Detail:  Informed consent was obtained for the procedure, including sedation. Risks of perforation, hemorrhage, adverse drug reaction, and aspiration were discussed. The patient was placed in the left lateral decubitus position. Based on the pre-procedure assessment, including review of the patient's medical history, medications, allergies, and review of systems, he had been deemed to be an appropriate candidate for moderate sedation; he was therefore sedated with the medications listed above. The patient was monitored continuously with ECG tracing, pulse oximetry, blood pressure monitoring, and direct observations. A rectal examination was performed. The [id number] was inserted into the rectum and advanced under direct vision to the cecum, which was identified by the ileocecal valve and appendiceal orifice. The quality of the colonic preparation was good. A careful inspection was made as the colonoscope was withdrawn, including a retroflexed view of the rectum; findings and interventions are described below. Appropriate photodocumentation was obtained. Findings:  Rectum: normal  Sigmoid: moderate diverticulosis; Descending Colon: moderate diverticulosis;  Transverse Colon: normal  Ascending Colon: normal  Cecum: 2 mm polyp removed with cold biopsy      Specimens:    colon polyp    EBL: None    Complications: None; patient tolerated the procedure well. Recommendations:     - Await pathology.     - If adenoma is present, repeat colonoscopy in 5 years. - High fiber diet.         Signed By: Sharri Mendoza MD                        March 19, 2021

## 2021-03-19 NOTE — ANESTHESIA PREPROCEDURE EVALUATION
Relevant Problems   No relevant active problems       Anesthetic History   No history of anesthetic complications            Review of Systems / Medical History  Patient summary reviewed, nursing notes reviewed and pertinent labs reviewed    Pulmonary  Within defined limits                 Neuro/Psych   Within defined limits           Cardiovascular                  Exercise tolerance: >4 METS     GI/Hepatic/Renal                Endo/Other    Diabetes         Other Findings              Physical Exam    Airway  Mallampati: II  TM Distance: > 6 cm  Neck ROM: normal range of motion   Mouth opening: Normal     Cardiovascular    Rhythm: regular           Dental  No notable dental hx       Pulmonary  Breath sounds clear to auscultation               Abdominal         Other Findings            Anesthetic Plan    ASA: 2  Anesthesia type: MAC          Induction: Intravenous  Anesthetic plan and risks discussed with: Patient

## 2021-03-19 NOTE — H&P
Colonoscopy History and Physical      The patient was seen and examined. Date of last colonoscopy: 2010, Polyps  Yes      The airway was assessed and documented. The problem list, past medical history, and medications were reviewed.      Patient Active Problem List   Diagnosis Code    Laceration of left lower leg without foreign body S81.812A    Chronic venous hypertension (idiopathic) with ulcer of left lower extremity 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 ([facility] L97.923    Secondary lymphedema I89.0     Social History     Socioeconomic History    Marital status:      Spouse name: Not on file    Number of children: Not on file    Years of education: Not on file    Highest education level: Not on file   Occupational History    Not on file   Social Needs    Financial resource strain: Not on file    Food insecurity     Worry: Not on file     Inability: Not on file    Transportation needs     Medical: Not on file     Non-medical: Not on file   Tobacco Use    Smoking status: Never Smoker    Smokeless tobacco: Never Used   Substance and Sexual Activity    Alcohol use: Yes     Comment: once a month    Drug use: Not on file    Sexual activity: Not on file   Lifestyle    Physical activity     Days per week: Not on file     Minutes per session: Not on file    Stress: Not on file   Relationships    Social connections     Talks on phone: Not on file     Gets together: Not on file     Attends Faith service: Not on file     Active member of club or organization: Not on file     Attends meetings of clubs or organizations: Not on file     Relationship status: Not on file    Intimate partner violence     Fear of current or ex partner: Not on file     Emotionally abused: Not on file     Physically abused: Not on file     Forced sexual activity: Not on file   Other Topics Concern     Service Not Asked    Blood Transfusions Not Asked   [facility] Caffeine Concern Not Asked    Occupational Exposure Not Asked    Hobby Hazards Not Asked    Sleep Concern Not Asked    Stress Concern Not Asked    Weight Concern Not Asked    Special Diet Not Asked    Back Care Not Asked    Exercise Not Asked    Bike Helmet Not Asked   [address] Not Asked    Self-Exams Not Asked   Social History Narrative    Not on file     Past Medical History:   Diagnosis Date    Diabetes ([facility]     Sleep apnea     CPAP         Prior to Admission Medications   Prescriptions Last Dose Informant Patient Reported? Taking?   fish oil-omega-3 fatty acids (FISH OIL) 300-500 mg cap 3/18/2021 at Unknown time  Yes Yes   Sig: Take  by mouth. furosemide (LASIX) 20 mg tablet 3/13/2021  Yes No   Sig: Take 40 mg by mouth two (2) times a day. metFORMIN (GLUCOPHAGE) 500 mg tablet 3/19/2021 at Unknown time  Yes Yes   Sig: Take 500 mg by mouth two (2) times daily (with meals). naproxen (NAPROSYN) 250 mg tablet 2/19/2021 at Unknown time  Yes Yes   Sig: Take  by mouth two (2) times daily (with meals). traMADoL (ULTRAM) 50 mg tablet 3/18/2021 at Unknown time  Yes Yes   Sig: Take 50 mg by mouth every six (6) hours as needed for Pain. turmeric (CURCUMIN) 3/18/2021 at Unknown time  Yes Yes   Sig: by Does Not Apply route. Facility-Administered Medications: None       The patient was seen and examined in the endoscopy suite. The airway was assessed and documented. The problem list and medications were reviewed. Chief complaint, history of present illness, and review of systems and Past medical History are positive for: sleep apnea and colon cancer screening    The heart, lungs and mental status were satisfactory for the administration of sedation and for the procedure. I discussed with the patient the objectives, risks, consequences and alternatives to the procedure.      The patient was counseled at length about the risks of potrer Covid-19 in the yamile-operative and post-operative states including the recovery window of their procedure. The patient was made aware that porter Covid-19 after a surgical procedure may worsen their prognosis for recovering from the virus and lend to a higher morbidity and or mortality risk. The patient was given the options of postponing their procedure. All of the risks, benefits, and alternatives were discussed. The patient does  wish to proceed with the procedure.     Plan: Endoscopic procedure with sedation     Ad Mabry MD   3/19/2021  12:59 PM

## 2021-03-19 NOTE — DISCHARGE INSTRUCTIONS
Brian Roy  [id number]  1955    COLON DISCHARGE INSTRUCTIONS  Discomfort:  Redness at IV site- apply warm compress to area; if redness or soreness persist- contact your physician  There may be a slight amount of blood passed from the rectum  Gaseous discomfort- walking, belching will help relieve any discomfort    DIET:   High fiber diet. - however -  remember your colon is empty and a heavy meal will produce gas. Avoid these foods:  vegetables, fried / greasy foods, carbonated drinks for today. You may not drink alcoholic beverages for at least 12 hours    MEDICATIONS:   Regarding Aspirin or Nonsteroidal medications, please see below. ACTIVITY:  You may resume your normal daily activities it is recommended that you spend the remainder of the day resting -  avoid any strenuous activity. You may not operate a vehicle for 12 hours  You may not engage in an occupation involving machinery or appliances for rest of today  Avoid making any critical decisions for at least 24 hour    CALL M.D. ANY SIGN OF:  Increasing pain, nausea, vomiting  Abdominal distension (swelling)  New increased bleeding (oral or rectal)  Fever (chills)  Pain in chest area  Bloody discharge from nose or mouth  Shortness of breath    You may not  take any Advil, Aspirin, Ibuprofen, Motrin, Aleve, or Goodys for 10 days, ONLY  Tylenol as needed for pain. Post procedure diagnosis: colon polyp, diverticulosis      Follow-up Instructions:   Call Dr. Ulices Hazel  Results of procedure / biopsy in 10 days  Telephone #  [phone]      DISCHARGE SUMMARY from Nurse    The following personal items collected during your admission are returned to you:   Dental Appliance: Dental Appliances: None  Vision: Visual Aid: Glasses  Hearing Aid:    Jewelry:    Clothing:    Other Valuables:    Valuables sent to safe:      Learning About Coronavirus (COVID-19)  Coronavirus (COVID-19): Overview  What is coronavirus ([id number])?   The coronavirus disease (COVID-19) is caused by a virus. It is an illness that was first found in [place], [place], in December 2019. It has since spread worldwide. The virus can cause fever, cough, and trouble breathing. In severe cases, it can cause pneumonia and make it hard to breathe without help. It can cause death. Coronaviruses are a large group of viruses. They cause the common cold. They also cause more serious illnesses like [place] East respiratory syndrome (MERS) and severe acute respiratory syndrome (SARS). COVID-19 is caused by a novel coronavirus. That means it's a new type that has not been seen in people before. This virus spreads person-to-person through droplets from coughing and sneezing. It can also spread when you are close to someone who is infected. And it can spread when you touch something that has the virus on it, such as a doorknob or a tabletop. What can you do to protect yourself from coronavirus (COVID-19)? The best way to protect yourself from getting sick is to:  · Avoid areas where there is an outbreak. · Avoid contact with people who may be infected. · Wash your hands often with soap or alcohol-based hand sanitizers. · Avoid crowds and try to stay at least 6 feet away from other people. · Wash your hands often, especially after you cough or sneeze. Use soap and water, and scrub for at least 20 seconds. If soap and water aren't available, use an alcohol-based hand . · Avoid touching your mouth, nose, and eyes. What can you do to avoid spreading the virus to others? To help avoid spreading the virus to others:  · Cover your mouth with a tissue when you cough or sneeze. Then throw the tissue in the trash. · Use a disinfectant to clean things that you touch often. · Stay home if you are sick or have been exposed to the virus. Don't go to school, work, or public areas. And don't use public transportation. · If you are sick:  ? Leave your home only if you need to get medical care.  But call the doctor's office first so they know you're coming. And wear a face mask, if you have one.  ? If you have a face mask, wear it whenever you're around other people. It can help stop the spread of the virus when you cough or sneeze. ? Clean and disinfect your home every day. Use household  and disinfectant wipes or sprays. Take special care to clean things that you grab with your hands. These include doorknobs, remote controls, phones, and handles on your refrigerator and microwave. And don't forget countertops, tabletops, bathrooms, and computer keyboards. When to call for help  Call 911 anytime you think you may need emergency care. For example, call if:  · You have severe trouble breathing. (You can't talk at all.)  · You have constant chest pain or pressure. · You are severely dizzy or lightheaded. · You are confused or can't think clearly. · Your face and lips have a blue color. · You pass out (lose consciousness) or are very hard to wake up. Call your doctor now if you develop symptoms such as:  · Shortness of breath. · Fever. · Cough. If you need to get care, call ahead to the doctor's office for instructions before you go. Make sure you wear a face mask, if you have one, to prevent exposing other people to the virus. Where can you get the latest information? The following health organizations are tracking and studying this virus. Their websites contain the most up-to-date information. [facility] also learn what to do if you think you may have been exposed to the virus. · U.[place]. Centers for Disease Control and Prevention (CDC): The CDC provides updated news about the disease and travel advice. The website also tells you how to prevent the spread of infection. www.cdc.gov  · World Health Organization [facility]): WHO offers information about the virus outbreaks.  WHO also has travel advice. www.who.int  Current as of: April 1, 2020               Content Version: 12.4  © 9323-7137 [employer]. Care instructions adapted under license by your healthcare professional. If you have questions about a medical condition or this instruction, always ask your healthcare professional. [address] any warranty or liability for your use of this information.

## (undated) DEVICE — FORCEPS BX L240CM JAW DIA2.8MM L CAP W/ NDL MIC MESH TOOTH